# Patient Record
Sex: FEMALE | Race: WHITE | HISPANIC OR LATINO | Employment: UNEMPLOYED | URBAN - METROPOLITAN AREA
[De-identification: names, ages, dates, MRNs, and addresses within clinical notes are randomized per-mention and may not be internally consistent; named-entity substitution may affect disease eponyms.]

---

## 2021-12-01 ENCOUNTER — OFFICE VISIT (OUTPATIENT)
Dept: OBSTETRICS AND GYNECOLOGY | Facility: CLINIC | Age: 26
End: 2021-12-01

## 2021-12-01 VITALS
BODY MASS INDEX: 23.52 KG/M2 | DIASTOLIC BLOOD PRESSURE: 84 MMHG | HEIGHT: 64 IN | WEIGHT: 137.8 LBS | SYSTOLIC BLOOD PRESSURE: 112 MMHG

## 2021-12-01 DIAGNOSIS — O21.9 VOMITING OR NAUSEA OF PREGNANCY: ICD-10-CM

## 2021-12-01 DIAGNOSIS — K21.00 GASTROESOPHAGEAL REFLUX DISEASE WITH ESOPHAGITIS WITHOUT HEMORRHAGE: ICD-10-CM

## 2021-12-01 DIAGNOSIS — Z78.9 USES SPANISH AS PRIMARY SPOKEN LANGUAGE: ICD-10-CM

## 2021-12-01 DIAGNOSIS — Z98.891 HISTORY OF 2 CESAREAN SECTIONS: ICD-10-CM

## 2021-12-01 DIAGNOSIS — Z30.2 REQUEST FOR STERILIZATION: ICD-10-CM

## 2021-12-01 DIAGNOSIS — Z13.89 SCREENING FOR GENITOURINARY CONDITION: Primary | ICD-10-CM

## 2021-12-01 DIAGNOSIS — N92.6 MISSED MENSES: ICD-10-CM

## 2021-12-01 LAB
B-HCG UR QL: POSITIVE
EXPIRATION DATE: ABNORMAL
INTERNAL NEGATIVE CONTROL: NEGATIVE
INTERNAL POSITIVE CONTROL: POSITIVE
Lab: 55

## 2021-12-01 PROCEDURE — 99204 OFFICE O/P NEW MOD 45 MIN: CPT | Performed by: OBSTETRICS & GYNECOLOGY

## 2021-12-01 PROCEDURE — 81025 URINE PREGNANCY TEST: CPT | Performed by: OBSTETRICS & GYNECOLOGY

## 2021-12-01 RX ORDER — ONDANSETRON 4 MG/1
4 TABLET, FILM COATED ORAL DAILY PRN
Qty: 30 TABLET | Refills: 1 | OUTPATIENT
Start: 2021-12-01 | End: 2022-01-04

## 2021-12-01 RX ORDER — PNV NO.95/FERROUS FUM/FOLIC AC 28MG-0.8MG
TABLET ORAL
COMMUNITY
Start: 2021-11-12

## 2021-12-01 RX ORDER — FAMOTIDINE 20 MG/1
20 TABLET, FILM COATED ORAL DAILY
Qty: 30 TABLET | Refills: 1 | Status: SHIPPED | OUTPATIENT
Start: 2021-12-01 | End: 2021-12-16 | Stop reason: SDUPTHER

## 2021-12-01 NOTE — PROGRESS NOTES
OB CONFIRMATION APPOINTMENT    CC- Pt has had a menstrual irregularity    Chief Complaint   Patient presents with   • Amenorrhea     Pt having some chest pain x8 days        HISTORY OF PRESENT ILLNESS:    Amenorrhea  This is a new problem. The current episode started more than 1 month ago. The problem occurs constantly. The problem has been unchanged. Associated symptoms include nausea and vomiting. Associated symptoms comments: GERD  . The symptoms are aggravated by exertion. She has tried nothing for the symptoms.       Linda Escobedo is being seen today to confirm she is pregnant.    She is a 26 y.o.  Patient's last menstrual period was 2021..  EDC= 22.  Gestational age is 10+wks     Current obstetric complaints : nause and GERD symptoms.      Prior obstetric issues, potential pregnancy concerns:  x 2    Family history of genetic issues (includes FOB): no    OFFERED GENETIC TESTING: CfDNA, Cystic fibrosis, Spinal muscular atrophy, Fragile X syndrome: yes    Prior infections concerning in pregnancy (Rash, fever in last 2 weeks): no    HISTORY REVIEWED:      History reviewed. No pertinent past medical history.    History reviewed. No pertinent surgical history.      Current Outpatient Medications:   •  famotidine (Pepcid) 20 MG tablet, Take 1 tablet by mouth Daily., Disp: 30 tablet, Rfl: 1  •  ondansetron (Zofran) 4 MG tablet, Take 1 tablet by mouth Daily As Needed for Nausea or Vomiting., Disp: 30 tablet, Rfl: 1  •  Prenatal Vit-Fe Fumarate-FA (prenatal vitamin 28-0.8) 28-0.8 MG tablet tablet, , Disp: , Rfl:     No Known Allergies    Social History     Socioeconomic History   • Marital status:        History reviewed. No pertinent family history.    REVIEW OF SYSTEMS:     Review of Systems   Constitutional: Negative.    HENT: Negative.    Eyes: Negative.    Respiratory: Negative.    Cardiovascular: Negative.    Gastrointestinal: Positive for nausea and vomiting.  "  Endocrine: Negative.    Genitourinary: Positive for menstrual problem.   Musculoskeletal: Negative.    Skin: Negative.    Allergic/Immunologic: Negative.    Neurological: Negative.    Hematological: Negative.    Psychiatric/Behavioral: Negative.        Physical Exam     Physical Exam  Vitals and nursing note reviewed.   Constitutional:       Appearance: She is well-developed.   HENT:      Head: Normocephalic and atraumatic.   Cardiovascular:      Rate and Rhythm: Normal rate.   Pulmonary:      Effort: Pulmonary effort is normal.   Abdominal:      General: There is no distension.      Palpations: Abdomen is soft. There is no mass.      Tenderness: There is no abdominal tenderness. There is no guarding.      Comments: No doppler FHTs   Genitourinary:     Vagina: No vaginal discharge.   Musculoskeletal:         General: No tenderness or deformity. Normal range of motion.      Cervical back: Normal range of motion.   Skin:     General: Skin is warm and dry.      Coloration: Skin is not pale.      Findings: No erythema or rash.   Neurological:      Mental Status: She is alert and oriented to person, place, and time.   Psychiatric:         Behavior: Behavior normal.         Thought Content: Thought content normal.         Judgment: Judgment normal.             Objective    /84   Ht 162.6 cm (64\")   Wt 62.5 kg (137 lb 12.8 oz)   LMP 2021   Breastfeeding No   BMI 23.65 kg/m²     Linda Escobedo  has no history on file for tobacco use..                Assessment    1) Pregnancy at Unknown   Diagnoses and all orders for this visit:    1. Screening for genitourinary condition (Primary)  -     POC Pregnancy, Urine    2. Missed menses    3. Uses Colombian as primary spoken language    4. History of 2  sections: needs rpt.    5. Request for sterilization    6. Gastroesophageal reflux disease with esophagitis without hemorrhage    7. Vomiting or nausea of pregnancy    Other orders  -     " ondansetron (Zofran) 4 MG tablet; Take 1 tablet by mouth Daily As Needed for Nausea or Vomiting.  Dispense: 30 tablet; Refill: 1  -     famotidine (Pepcid) 20 MG tablet; Take 1 tablet by mouth Daily.  Dispense: 30 tablet; Refill: 1         Plan    Patient is on Prenatal vitamins  Problem list reviewed and updated.  Reviewed routine prenatal care with the patient  Zika (travel restrictions/ok to use insect repellant), not to changing cat litter, food restrictions, avoidance of alcohol, tobacco and drugs and saunas/hot tubs.   All questions answered.     Counseling was given to patient and family for the following topics: diagnostic results, instructions for management, risk factor reductions, prognosis, patient and family education, impressions, risks and benefits of treatment options and importance of treatment compliance .     I spent 45 minutes caring for Linda on this date of service. This time includes time spent by me in the following activities: preparing for the visit, reviewing tests, obtaining and/or reviewing a separately obtained history, performing a medically appropriate examination and/or evaluation, counseling and educating the patient/family/caregiver, ordering medications, tests, or procedures, referring and communicating with other health care professionals, documenting information in the medical record, independently interpreting results and communicating that information with the patient/family/caregiver, care coordination and reviewing u/s at bedside      RTO Return in about 2 weeks (around 12/15/2021) for ob phys w/ pap.    Morgan Guzman MD    12/1/2021  14:51 EST

## 2021-12-16 ENCOUNTER — INITIAL PRENATAL (OUTPATIENT)
Dept: OBSTETRICS AND GYNECOLOGY | Facility: CLINIC | Age: 26
End: 2021-12-16

## 2021-12-16 VITALS — DIASTOLIC BLOOD PRESSURE: 76 MMHG | SYSTOLIC BLOOD PRESSURE: 114 MMHG | WEIGHT: 136.4 LBS | BODY MASS INDEX: 23.41 KG/M2

## 2021-12-16 DIAGNOSIS — K21.00 GASTROESOPHAGEAL REFLUX DISEASE WITH ESOPHAGITIS WITHOUT HEMORRHAGE: ICD-10-CM

## 2021-12-16 DIAGNOSIS — O21.9 VOMITING OR NAUSEA OF PREGNANCY: ICD-10-CM

## 2021-12-16 DIAGNOSIS — Z11.51 ENCOUNTER FOR SCREENING FOR HUMAN PAPILLOMAVIRUS (HPV): ICD-10-CM

## 2021-12-16 DIAGNOSIS — Z36.9 ENCOUNTER FOR ANTENATAL SCREENING, UNSPECIFIED: ICD-10-CM

## 2021-12-16 DIAGNOSIS — Z98.891 HISTORY OF 2 CESAREAN SECTIONS: ICD-10-CM

## 2021-12-16 DIAGNOSIS — Z34.91 INITIAL OBSTETRIC VISIT IN FIRST TRIMESTER: ICD-10-CM

## 2021-12-16 DIAGNOSIS — Z01.419 PAP SMEAR, LOW-RISK: Primary | ICD-10-CM

## 2021-12-16 LAB
C TRACH RRNA SPEC DONR QL NAA+PROBE: NEGATIVE
GLUCOSE UR STRIP-MCNC: NEGATIVE MG/DL
N GONORRHOEA DNA SPEC QL NAA+PROBE: NEGATIVE
PROT UR STRIP-MCNC: NEGATIVE MG/DL

## 2021-12-16 PROCEDURE — 99214 OFFICE O/P EST MOD 30 MIN: CPT | Performed by: NURSE PRACTITIONER

## 2021-12-16 RX ORDER — FAMOTIDINE 20 MG/1
20 TABLET, FILM COATED ORAL 2 TIMES DAILY
Qty: 60 TABLET | Refills: 1 | OUTPATIENT
Start: 2021-12-16 | End: 2022-01-04

## 2021-12-16 NOTE — PROGRESS NOTES
Initial ob visit     Chief Complaint   Patient presents with   • Initial Prenatal Visit       Linda Escobedo is being seen today for her first obstetrical visit.  She is a 26 y.o.  @  12w3d gestation. This problem is new to me: yes. She has a history of  x 2.     # 1 - Date: None, Sex: None, Weight: None, GA: None, Delivery: , Low Transverse, Apgar1: None, Apgar5: None, Living: None, Birth Comments: None    # 2 - Date: None, Sex: None, Weight: None, GA: None, Delivery: , Low Transverse, Apgar1: None, Apgar5: None, Living: None, Birth Comments: None    # 3 - Date: None, Sex: None, Weight: None, GA: None, Delivery: None, Apgar1: None, Apgar5: None, Living: None, Birth Comments: None      LNMP: 21  Confident with date: Yes  Taking prenatal vitamins: Yes  Planned pregnancy: Yes  Prior obstetric issues, potential pregnancy concerns: c-sections x 2   Family history of genetic issues (includes FOB): denies   Varicella Hx: uncertain   Flu vaccine: S/P vaccine  COVID Vaccine: S/P COVID vaccine   History of STDs: denies HSV  Current medications: PNV   Last pap smear: Uncertain location of testing, reports pap 7 months ago    Smoker: No  Drug or alcohol abuse: No  H/O Physical, mental or sexual abuse: denies  H/O mental health disorder: denies   Prior testing for Cystic Fibrosis Carrier or Sickle Cell Trait- uncertain   Prepregnancy BMI: Body mass index is 23.41 kg/m².      No past medical history on file.    No past surgical history on file.      Current Outpatient Medications:   •  famotidine (Pepcid) 20 MG tablet, Take 1 tablet by mouth Daily., Disp: 30 tablet, Rfl: 1  •  ondansetron (Zofran) 4 MG tablet, Take 1 tablet by mouth Daily As Needed for Nausea or Vomiting., Disp: 30 tablet, Rfl: 1  •  Prenatal Vit-Fe Fumarate-FA (prenatal vitamin 28-0.8) 28-0.8 MG tablet tablet, , Disp: , Rfl:     No Known Allergies    Social History     Socioeconomic History   • Marital  Please advise on simvastatin.    status:        No family history on file.    Review of systems     All other systems reviewed and are negative except for: Constitutional: positive for fatigue  Gastrointestinal: positive for nausea, reflux symptoms and vomiting     Objective    /76   Wt 61.9 kg (136 lb 6.4 oz)   LMP 09/20/2021   BMI 23.41 kg/m²       General Appearance:    Alert, cooperative, in no acute distress, habitus normal   Head:    Not examined   Eyes:           Not examined   Ears:  Not examined       Neck:  No thyroid enlargement or nodules present   Back:     No kyphosis present, no scoliosis present,                       Lungs:     Clear to auscultation,respirations regular, even and                   unlabored    Heart:    Regular rhythm and normal rate, normal S1 and S2, no            murmur, no gallop, no rub, no click   Breast Exam:    No masses, No nipple discharge   Abdomen:     Normal bowel sounds, no masses, no organomegaly, soft        non-tender, non-distended, no guarding, no rebound                 tenderness   Genitalia:    Vulva - No masses, no atrophy, no lesions    Vagina - No discharge, No bleeding    Cervix - No Lesions, closed. Pap collected:Yes     Uterus - Consistent with 12 weeks.     Adnexa - No masses, non tender       Extremities:   Moves all extremities well, no edema, no cyanosis, no              redness       Skin:   No bleeding, bruising or rash       Neurologic:   Sensation intact, A&O times 3      Assessment/Plan    1) Pregnancy at 12w3d- US IMP: Single, viable IUP @ 12.3 weeks.  US findings discussed with patient. EDC established 6/27/22 and confirmed by US and LNMP.     2) OB exam: OB exam completed: Yes. New OB bag provided Yes. Pap collected: Yes.    3) Labs: OB labs collected: Yes Counseled on genetic screening: Yes, she declines CF, SMA, and FX. Counseled on Quad screen and AFP: Yes, she is too early for  AFP. Counseled on NIPS: Yes, she declines NIPS.      4) Patient's Body mass  index is 23.41 kg/m². indicating that she is within normal range (BMI 18.5-24.9). No BMI management plan needed. For women with a normal weight, with a BMI between 18.5-24.5 before pregnancy, the recommended weight gain is 25-35 pounds for the entire pregnancy      5)  Prenatal care: Oriented to the office and prenatal care. Encourage prenatal vitamins. Disc Tylenol products are fine, avoid aspirin and ibuprofen; Zika (travel restrictions/ok to use insect repellant); not to change cat litter; food restrictions; exercise;  avoidance of alcohol, tobacco, drugs and saunas/hot tubs.     6) COVID19 precautions were reviewed with the patient. Continue to encourage social distancing, wearing a mask, and good hand hygiene.  I wore a mask, protective eye wear, and gloves during this patient encounter.  Patient also wearing a surgical mask and social distancing was observed. Hand hygeine performed before and after seeing the patient. Info provided on Covid vaccine: S/P COVID vaccine. Rec Booster vaccine if she is >6 months post vaccine.     7) H/O  x 2- Plan repeat @ 39 weeks    8) French speaking-  used     9) Heartburn- Taking Pepcid daily. ERX Pepcid for BID dosing.     10) Nausea and vomiting- Has zofran for PRN use.     All questions answered.     RTO 4 weeks     AMPARO Phillips  2021  16:00 EST

## 2021-12-17 LAB
ABO GROUP BLD: ABNORMAL
BASOPHILS # BLD AUTO: 0 X10E3/UL (ref 0–0.2)
BASOPHILS NFR BLD AUTO: 0 %
BLD GP AB SCN SERPL QL: NEGATIVE
EOSINOPHIL # BLD AUTO: 0.1 X10E3/UL (ref 0–0.4)
EOSINOPHIL NFR BLD AUTO: 0 %
ERYTHROCYTE [DISTWIDTH] IN BLOOD BY AUTOMATED COUNT: 13.6 % (ref 11.7–15.4)
HBA1C MFR BLD: 5.4 % (ref 4.8–5.6)
HBV SURFACE AG SERPL QL IA: NEGATIVE
HCT VFR BLD AUTO: 34.8 % (ref 34–46.6)
HCV AB S/CO SERPL IA: 0.1 S/CO RATIO (ref 0–0.9)
HGB BLD-MCNC: 11.6 G/DL (ref 11.1–15.9)
HIV 1+2 AB+HIV1 P24 AG SERPL QL IA: NON REACTIVE
IMM GRANULOCYTES # BLD AUTO: 0 X10E3/UL (ref 0–0.1)
IMM GRANULOCYTES NFR BLD AUTO: 0 %
LYMPHOCYTES # BLD AUTO: 2.6 X10E3/UL (ref 0.7–3.1)
LYMPHOCYTES NFR BLD AUTO: 23 %
MCH RBC QN AUTO: 29.5 PG (ref 26.6–33)
MCHC RBC AUTO-ENTMCNC: 33.3 G/DL (ref 31.5–35.7)
MCV RBC AUTO: 89 FL (ref 79–97)
MONOCYTES # BLD AUTO: 0.6 X10E3/UL (ref 0.1–0.9)
MONOCYTES NFR BLD AUTO: 5 %
NEUTROPHILS # BLD AUTO: 7.8 X10E3/UL (ref 1.4–7)
NEUTROPHILS NFR BLD AUTO: 72 %
PLATELET # BLD AUTO: 343 X10E3/UL (ref 150–450)
RBC # BLD AUTO: 3.93 X10E6/UL (ref 3.77–5.28)
RH BLD: POSITIVE
RPR SER QL: NON REACTIVE
RUBV IGG SERPL IA-ACNC: 18 INDEX
VZV IGG SER IA-ACNC: 930 INDEX
WBC # BLD AUTO: 11.1 X10E3/UL (ref 3.4–10.8)

## 2021-12-20 LAB
AMPHETAMINES UR QL SCN: NEGATIVE NG/ML
BACTERIA UR CULT: NO GROWTH
BACTERIA UR CULT: NORMAL
BARBITURATES UR QL SCN: NEGATIVE NG/ML
BENZODIAZ UR QL SCN: NEGATIVE NG/ML
BZE UR QL SCN: NEGATIVE NG/ML
CANNABINOIDS UR QL SCN: NEGATIVE NG/ML
CREAT UR-MCNC: 34.3 MG/DL (ref 20–300)
LABORATORY COMMENT REPORT: NORMAL
METHADONE UR QL SCN: NEGATIVE NG/ML
OPIATES UR QL SCN: NEGATIVE NG/ML
OXYCODONE+OXYMORPHONE UR QL SCN: NEGATIVE NG/ML
PCP UR QL: NEGATIVE NG/ML
PH UR: 6.5 [PH] (ref 4.5–8.9)
PROPOXYPH UR QL SCN: NEGATIVE NG/ML

## 2021-12-21 LAB
C TRACH RRNA CVX QL NAA+PROBE: NEGATIVE
CONV .: NORMAL
CYTOLOGIST CVX/VAG CYTO: NORMAL
CYTOLOGY CVX/VAG DOC CYTO: NORMAL
CYTOLOGY CVX/VAG DOC THIN PREP: NORMAL
DX ICD CODE: NORMAL
HIV 1 & 2 AB SER-IMP: NORMAL
N GONORRHOEA RRNA CVX QL NAA+PROBE: NEGATIVE
OTHER STN SPEC: NORMAL
STAT OF ADQ CVX/VAG CYTO-IMP: NORMAL
T VAGINALIS RRNA SPEC QL NAA+PROBE: NEGATIVE

## 2022-01-04 ENCOUNTER — HOSPITAL ENCOUNTER (EMERGENCY)
Facility: HOSPITAL | Age: 27
Discharge: HOME | End: 2022-01-04
Attending: EMERGENCY MEDICINE | Admitting: EMERGENCY MEDICINE

## 2022-01-04 VITALS
HEIGHT: 63 IN | DIASTOLIC BLOOD PRESSURE: 70 MMHG | RESPIRATION RATE: 18 BRPM | HEART RATE: 65 BPM | WEIGHT: 131.7 LBS | OXYGEN SATURATION: 100 % | BODY MASS INDEX: 23.34 KG/M2 | SYSTOLIC BLOOD PRESSURE: 112 MMHG | TEMPERATURE: 98.6 F

## 2022-01-04 DIAGNOSIS — G47.00 INSOMNIA, UNSPECIFIED TYPE: Primary | ICD-10-CM

## 2022-01-04 PROCEDURE — 99282 EMERGENCY DEPT VISIT SF MDM: CPT | Performed by: EMERGENCY MEDICINE

## 2022-01-04 PROCEDURE — 99283 EMERGENCY DEPT VISIT LOW MDM: CPT

## 2022-01-04 RX ORDER — CHOLECALCIFEROL (VITAMIN D3) 125 MCG
10 CAPSULE ORAL
COMMUNITY

## 2022-01-04 RX ORDER — ACETAMINOPHEN 500 MG
1000 TABLET ORAL ONCE
Status: COMPLETED | OUTPATIENT
Start: 2022-01-04 | End: 2022-01-04

## 2022-01-04 RX ADMIN — ACETAMINOPHEN 1000 MG: 500 TABLET ORAL at 16:49

## 2022-01-04 NOTE — ED PROVIDER NOTES
EMERGENCY DEPARTMENT ENCOUNTER      Room Number: 10/10      HPI:    Chief complaint: Insomnia    Location: Not applicable    Quality/Severity: Moderate    Timing/Duration: Patient states that she has been unable to sleep for 5 days    Modifying Factors: Patient tried taking melatonin last evening without sleep being induced.    Associated Symptoms: Frontal headache started yesterday    Narrative: Pt is a 26 y.o. female who presents complaining of insomnia x5 days.  Patient states that she is very tired, but cannot fall asleep after laying down.  Patient is a currently in her second trimester of pregnancy, but denies any unusual new stressors.      PMD: System, Provider Not In    REVIEW OF SYSTEMS  Review of Systems   Constitutional: Positive for appetite change and fatigue. Negative for activity change and fever.   HENT: Negative for congestion.    Respiratory: Negative for shortness of breath.    Cardiovascular: Negative for chest pain.   Neurological: Positive for headaches.   Psychiatric/Behavioral: Positive for sleep disturbance.   All other systems reviewed and are negative.      PAST MEDICAL HISTORY  Active Ambulatory Problems     Diagnosis Date Noted   • Uses Belarusian as primary spoken language 2021   • Request for sterilization 2021   • History of 2  sections: needs rpt. 2021   • Gastroesophageal reflux disease with esophagitis without hemorrhage 2021   • Vomiting or nausea of pregnancy 2021     Resolved Ambulatory Problems     Diagnosis Date Noted   • No Resolved Ambulatory Problems     No Additional Past Medical History       PAST SURGICAL HISTORY  History reviewed. No pertinent surgical history.    FAMILY HISTORY  History reviewed. No pertinent family history.    SOCIAL HISTORY  Social History     Socioeconomic History   • Marital status:    Tobacco Use   • Smoking status: Never Smoker   Substance and Sexual Activity   • Alcohol use: Never   • Drug use: Never        ALLERGIES  Patient has no known allergies.    PHYSICAL EXAM  ED Triage Vitals   Temp Heart Rate Resp BP SpO2   01/04/22 1552 01/04/22 1549 01/04/22 1549 01/04/22 1549 01/04/22 1549   98.6 °F (37 °C) 65 18 112/70 100 %      Temp src Heart Rate Source Patient Position BP Location FiO2 (%)   01/04/22 1549 01/04/22 1549 01/04/22 1549 01/04/22 1549 --   Oral Monitor Sitting Right arm        Physical Exam  Constitutional:       Appearance: Normal appearance. She is normal weight.   Cardiovascular:      Rate and Rhythm: Normal rate and regular rhythm.      Heart sounds: Normal heart sounds. No murmur heard.      Pulmonary:      Effort: Pulmonary effort is normal.      Breath sounds: Normal breath sounds.   Musculoskeletal:      Right lower leg: No edema.      Left lower leg: No edema.   Neurological:      General: No focal deficit present.      Mental Status: She is alert and oriented to person, place, and time.   Psychiatric:      Comments: Mild somnolence         LAB RESULTS  Results for orders placed or performed in visit on 12/16/21   Urine Culture - Urine, Urine, Random Void    Specimen: Urine, Random Void   Result Value Ref Range    Urine Culture Final report     Result 1 No growth    Obstetric Panel    Specimen: Blood   Result Value Ref Range    Hepatitis B Surface Ag Negative Negative    Hep C Virus Ab 0.1 0.0 - 0.9 s/co ratio    RPR Non Reactive Non Reactive    Rubella Antibodies, IgG 18.00 Immune >0.99 index    ABO Type O     Rh Factor Positive     Antibody Screen Negative Negative    WBC 11.1 (H) 3.4 - 10.8 x10E3/uL    RBC 3.93 3.77 - 5.28 x10E6/uL    Hemoglobin 11.6 11.1 - 15.9 g/dL    Hematocrit 34.8 34.0 - 46.6 %    MCV 89 79 - 97 fL    MCH 29.5 26.6 - 33.0 pg    MCHC 33.3 31.5 - 35.7 g/dL    RDW 13.6 11.7 - 15.4 %    Platelets 343 150 - 450 x10E3/uL    Neutrophil Rel % 72 Not Estab. %    Lymphocyte Rel % 23 Not Estab. %    Monocyte Rel % 5 Not Estab. %    Eosinophil Rel % 0 Not Estab. %    Basophil Rel  % 0 Not Estab. %    Neutrophils Absolute 7.8 (H) 1.4 - 7.0 x10E3/uL    Lymphocytes Absolute 2.6 0.7 - 3.1 x10E3/uL    Monocytes Absolute 0.6 0.1 - 0.9 x10E3/uL    Eosinophils Absolute 0.1 0.0 - 0.4 x10E3/uL    Basophils Absolute 0.0 0.0 - 0.2 x10E3/uL    Immature Granulocyte Rel % 0 Not Estab. %    Immature Grans Absolute 0.0 0.0 - 0.1 x10E3/uL   HIV-1 / O / 2 Ag / Antibody 4th Generation    Specimen: Blood   Result Value Ref Range    HIV Screen 4th Gen w/RFX (Reference) Non Reactive Non Reactive   Hemoglobin A1c    Specimen: Blood   Result Value Ref Range    Hemoglobin A1C 5.4 4.8 - 5.6 %   Urine Drug Screen - Urine, Random Void    Specimen: Urine, Random Void   Result Value Ref Range    Amphetamine, Urine Qual Negative Eooqmq=2994 ng/mL    Barbiturates Screen, Urine Negative Pahoju=650 ng/mL    Benzodiazepine Screen, Urine Negative Uxghrv=900 ng/mL    THC Screen, Urine Negative Cutoff=20 ng/mL    Cocaine Screen, Urine Negative Ipkypo=882 ng/mL    Opiate Screen, Urine Negative Rdvhac=361 ng/mL    Oxycodone/Oxymorphone, Urine Negative Bfqzwa=351 ng/mL    Phencyclidine (PCP), Urine Negative Cutoff=25 ng/mL    Methadone Screen, Urine Negative Njcpxg=106 ng/mL    Propoxyphene Screen Negative Rplzfc=877 ng/mL    Creatinine, Urine 34.3 20.0 - 300.0 mg/dL    pH, UA 6.5 4.5 - 8.9    Please note Comment    Varicella Zoster Antibody, IgG    Specimen: Blood   Result Value Ref Range    Varicella IgG 930 Immune >165 index   POC Urinalysis Dipstick    Specimen: Urine   Result Value Ref Range    Glucose, UA Negative Negative, 1000 mg/dL (3+)    Protein, POC Negative Negative   IGP,CtNgTv,rfx Aptima HPV ASCU    Specimen: Cervix; ThinPrep Vial   Result Value Ref Range    Diagnosis Comment     Specimen adequacy: Comment     Clinician Provided ICD-10: Comment     Performed by: Comment     . .     Note: Comment     Method: Comment     Conv .conv Comment     Chlamydia, Nuc. Acid Amp Negative Negative    Gonococcus by Nucleic Acid Amp  Negative Negative    Trichomonas vaginosis Negative Negative         I ordered the above labs and reviewed the results    RADIOLOGY  US Ob Transvaginal    Result Date: 12/30/2021  Narrative: IMP: SIngle, viable IUP @ 12.3 weeks. EDC 6/27/22. Ovaries wnl. Uterus AV. .       I ordered the above radiologic testing and reviewed the results    PROCEDURES  Procedures      PROGRESS AND CONSULTS  ED Course as of 01/04/22 1702 Tue Jan 04, 2022 1700 Through a video  it was explained that a sleep routine should help alleviate her problems (thoroughly explained).  Patient was advised that she could take 10 mg of melatonin 1 hour prior to her bedtime.  Patient also advised that she can take Tylenol for headaches. [ML]      ED Course User Index  [ML] Fracisco Victoria MD           MEDICAL DECISION MAKING  Results were reviewed/discussed with the patient and they were also made aware of online access. Pt also made aware that some labs, such as cultures, will not be resulted during ER visit and follow up with PMD is necessary.     MDM       DIAGNOSIS  Final diagnoses:   Insomnia, unspecified type       Latest Documented Vital Signs:  As of 16:57 EST  BP- 112/70 HR- 65 Temp- 98.6 °F (37 °C) (Oral) O2 sat- 100%    DISPOSITION  Discharged in good condition       Medication List      Stop    famotidine 20 MG tablet  Commonly known as: Pepcid     ondansetron 4 MG tablet  Commonly known as: Zofran             Follow-up Information     Your obstetrician as scheduled.                          Fracisco Victoria MD  01/04/22 1702

## 2022-01-04 NOTE — DISCHARGE INSTRUCTIONS
Starting 90 minutes prior to your bedtime begin to wind down.  This means to stop using your smart phone, watching television and any participation in activities where you are moving around a lot.  Take 10 mg of melatonin 1 hour before bedtime.  You may use Tylenol for headache.

## 2022-01-05 ENCOUNTER — ROUTINE PRENATAL (OUTPATIENT)
Dept: OBSTETRICS AND GYNECOLOGY | Facility: CLINIC | Age: 27
End: 2022-01-05

## 2022-01-05 VITALS — WEIGHT: 133.4 LBS | SYSTOLIC BLOOD PRESSURE: 100 MMHG | BODY MASS INDEX: 23.63 KG/M2 | DIASTOLIC BLOOD PRESSURE: 68 MMHG

## 2022-01-05 DIAGNOSIS — Z34.92 PRENATAL CARE IN SECOND TRIMESTER: Primary | ICD-10-CM

## 2022-01-05 DIAGNOSIS — Z36.9 ENCOUNTER FOR ANTENATAL SCREENING, UNSPECIFIED: ICD-10-CM

## 2022-01-05 DIAGNOSIS — O26.892 HEADACHE IN PREGNANCY, ANTEPARTUM, SECOND TRIMESTER: ICD-10-CM

## 2022-01-05 DIAGNOSIS — Z78.9 USES SPANISH AS PRIMARY SPOKEN LANGUAGE: ICD-10-CM

## 2022-01-05 DIAGNOSIS — R51.9 HEADACHE IN PREGNANCY, ANTEPARTUM, SECOND TRIMESTER: ICD-10-CM

## 2022-01-05 LAB
EXTERNAL CYSTIC FIBROSIS: NORMAL
EXTERNAL NIPT: NORMAL
GLUCOSE UR STRIP-MCNC: NEGATIVE MG/DL
PROT UR STRIP-MCNC: NEGATIVE MG/DL

## 2022-01-05 PROCEDURE — 99213 OFFICE O/P EST LOW 20 MIN: CPT | Performed by: NURSE PRACTITIONER

## 2022-01-05 RX ORDER — ACETAMINOPHEN 500 MG
500 TABLET ORAL EVERY 6 HOURS PRN
COMMUNITY

## 2022-01-05 NOTE — PROGRESS NOTES
OB follow up     CC:  Here for prenatal follow up    Linda Escobedo is a 26 y.o.  15w2d being seen today for her obstetrical visit.  Patient reports headache. States she was seen in the ER yesterday for her c/o. A review of records from the ER reports a c/o insomnia. The patient reports she was given 2 pills to take. Her HA improved after taking the pill but her HA returned shortly after taking the medication. She was not tested for COVID. Taking +PNV.     Review of Systems  Consitutional: neg fatigue  Cardiovascular: neg edema  Gastrointestinal: neg n/v  Genitourinary: Neg for cramping, vaginal bleeding, SROM, or dysuria.   Neuro: + HA       /68   Wt 60.5 kg (133 lb 6.4 oz)   LMP 2021   BMI 23.63 kg/m²     FHT: present BPM   Uterine Size: size equals dates       Assessment    1) Pregnancy at 15w2d- Desires Quad screen.     2) COVID19 precautions were reviewed with the patient. Continue to encourage social distancing, wearing a mask, and good hand hygiene.  I wore a mask, protective eye wear, and gloves during this patient encounter.  Patient also wearing a surgical mask and social distancing was observed. Hand hygeine performed before and after seeing the patient. Info provided on Covid vaccine: S/P COVID vaccine. Rec Booster vaccine if she is >6 months post vaccine.      3) H/O  x 2- Plan repeat @ 39 weeks     4) Jamaican speaking-  used      5) Heartburn- Taking Pepcid BID.      6) Nausea and vomiting- Has zofran for PRN use.     7) Headache- Enc use of antihistamine, increased H20, and caffeine.       Plan    Continue prenatal vitamins   Reviewed this stage of pregnancy  Problem list updated   Follow up in 4 weeks for OB tummy and anatomy US     Eve Maradiaga, APRN  2022  14:14 EST

## 2022-01-08 LAB
2ND TRIMESTER 4 SCREEN SERPL-IMP: ABNORMAL
AFP ADJ MOM SERPL: 0.59
AFP SERPL-MCNC: 19.5 NG/ML
AGE AT DELIVERY: 29.1 YR
FET TS 18 RISK FROM MAT AGE: ABNORMAL
FET TS 21 RISK FROM MAT AGE: 773
GA METHOD: ABNORMAL
GA: 15.3 WEEKS
HCG ADJ MOM SERPL: 2.25
HCG SERPL-ACNC: ABNORMAL MIU/ML
IDDM PATIENT QL: NO
INHIBIN A ADJ MOM SERPL: 1.69
INHIBIN A SERPL-MCNC: 307.95 PG/ML
MULTIPLE PREGNANCY: NO
NEURAL TUBE DEFECT RISK FETUS: ABNORMAL %
SERVICE CMNT-IMP: ABNORMAL
TS 18 RISK FETUS: ABNORMAL
TS 21 RISK FETUS: 40
U ESTRIOL ADJ MOM SERPL: 0.57
U ESTRIOL SERPL-MCNC: 0.48 NG/ML

## 2022-02-02 ENCOUNTER — ROUTINE PRENATAL (OUTPATIENT)
Dept: OBSTETRICS AND GYNECOLOGY | Facility: CLINIC | Age: 27
End: 2022-02-02

## 2022-02-02 VITALS — WEIGHT: 132 LBS | SYSTOLIC BLOOD PRESSURE: 98 MMHG | DIASTOLIC BLOOD PRESSURE: 62 MMHG | BODY MASS INDEX: 23.38 KG/M2

## 2022-02-02 DIAGNOSIS — Z78.9 USES SPANISH AS PRIMARY SPOKEN LANGUAGE: ICD-10-CM

## 2022-02-02 DIAGNOSIS — Z34.92 NORMAL PREGNANCY IN SECOND TRIMESTER: Primary | ICD-10-CM

## 2022-02-02 DIAGNOSIS — Z98.891 HISTORY OF 2 CESAREAN SECTIONS: ICD-10-CM

## 2022-02-02 DIAGNOSIS — Z30.2 REQUEST FOR STERILIZATION: ICD-10-CM

## 2022-02-02 LAB
GLUCOSE UR STRIP-MCNC: NEGATIVE MG/DL
PROT UR STRIP-MCNC: NEGATIVE MG/DL

## 2022-02-02 PROCEDURE — 99214 OFFICE O/P EST MOD 30 MIN: CPT | Performed by: OBSTETRICS & GYNECOLOGY

## 2022-02-02 RX ORDER — ONDANSETRON 4 MG/1
4 TABLET, FILM COATED ORAL EVERY 8 HOURS PRN
Qty: 30 TABLET | Refills: 2 | Status: SHIPPED | OUTPATIENT
Start: 2022-02-02

## 2022-02-02 NOTE — PROGRESS NOTES
OB follow up     Linda Escobedo is a 26 y.o.  19w2d being seen today for her obstetrical visit.  Patient reports no bleeding, no contractions and no leaking. Fetal movement: normal.  Prenatal care complicated by 2 previous C-sections.  She requests permanent sterilization.  She is a Yoruba as a primary spoken language and a  was used throughout this visit.  She is concerned because she is losing weight.  She has not having severe nausea and vomiting and she is using Zofran at home and would like a refill.    Review of Systems  No bleeding, No cramping/contractions     BP 98/62   Wt 59.9 kg (132 lb)   LMP 2021   BMI 23.38 kg/m²     FHT: present BPM   Uterine Size: 19 cm   Ultrasound shows live viable john fetus with normal growth in the transverse position.  Fetus appears female.  Good cardiac activity and heart rate is 146 bpm.  Normal anatomical survey but not all cardiac views were seen.  Normal fluid level.    Assessment/Plan:    1) 26 y.o.  -pregnancy at 19w2d    2)   Encounter Diagnoses   Name Primary?   • Normal pregnancy in second trimester Yes   • History of 2  sections: needs rpt.    • Request for sterilization    • Uses Yoruba as primary spoken language    Plan repeat low-transverse  section and tubal ligation at 39 weeks.  Recommend repeat cardiac views in 4 weeks.  Refill Zofran as needed.  Watch weight.    3) Reviewed this stage of pregnancy  4) Problem list updated     Return in about 4 weeks (around 3/2/2022) for OB Raman, US, Anatomy (heart views).      Peter Babb MD    2022  15:01 EST

## 2022-03-02 ENCOUNTER — ROUTINE PRENATAL (OUTPATIENT)
Dept: OBSTETRICS AND GYNECOLOGY | Facility: CLINIC | Age: 27
End: 2022-03-02

## 2022-03-02 VITALS — WEIGHT: 135 LBS | SYSTOLIC BLOOD PRESSURE: 100 MMHG | DIASTOLIC BLOOD PRESSURE: 60 MMHG | BODY MASS INDEX: 23.91 KG/M2

## 2022-03-02 DIAGNOSIS — Z78.9 USES SPANISH AS PRIMARY SPOKEN LANGUAGE: ICD-10-CM

## 2022-03-02 DIAGNOSIS — Z98.891 HISTORY OF 2 CESAREAN SECTIONS: ICD-10-CM

## 2022-03-02 DIAGNOSIS — Z34.92 PRENATAL CARE IN SECOND TRIMESTER: Primary | ICD-10-CM

## 2022-03-02 DIAGNOSIS — Z30.2 REQUEST FOR STERILIZATION: ICD-10-CM

## 2022-03-02 LAB
GLUCOSE UR STRIP-MCNC: NEGATIVE MG/DL
PROT UR STRIP-MCNC: NEGATIVE MG/DL

## 2022-03-02 PROCEDURE — 99213 OFFICE O/P EST LOW 20 MIN: CPT | Performed by: NURSE PRACTITIONER

## 2022-03-02 NOTE — PROGRESS NOTES
OB follow up     CC:  Here for prenatal follow up    Linda Escobedo is a 26 y.o.  @ 23w2d being seen today for her obstetrical visit.  She had a repeat US today to complete her anatomy US.       Review of Systems  Consitutional: neg fatigue  Cardiovascular: neg edema  Gastrointestinal: neg n/v  Genitourinary: Neg for cramping, vaginal bleeding, SROM, or dysuria.         /60   Wt 61.2 kg (135 lb)   LMP 2021   BMI 23.91 kg/m²     FHT: present BPM   Uterine Size: size equals dates       Assessment    1) Pregnancy at 23w2d- IMP: Growth 30%. Cardiac anatomy normal today.     2) COVID19 precautions were reviewed with the patient. Continue to encourage social distancing, wearing a mask, and good hand hygiene.  I wore a mask, protective eye wear, and gloves during this patient encounter.  Patient also wearing a surgical mask and social distancing was observed. Hand hygeine performed before and after seeing the patient. Info provided on Covid vaccine: S/P COVID vaccine. Rec Booster vaccine if she is >6 months post vaccine.      3) H/O  x 2- Plan repeat @ 39 weeks     4) Somali speaking-  used     5) Nausea and vomiting- Improved     6) Flu vaccine- Enc flu vaccine     7)  Request for sterilization- Desires bilateral salpingectomy. Sign tubal consent today.       Plan    Continue prenatal vitamins   Reviewed this stage of pregnancy  Problem list updated   Follow up in 4 weeks for OB tummy and 2hr GTT     AMPARO Phillips  3/2/2022  16:18 EST

## 2022-03-08 DIAGNOSIS — Z36.9 ENCOUNTER FOR ANTENATAL SCREENING, UNSPECIFIED: Primary | ICD-10-CM

## 2022-03-30 ENCOUNTER — ROUTINE PRENATAL (OUTPATIENT)
Dept: OBSTETRICS AND GYNECOLOGY | Facility: CLINIC | Age: 27
End: 2022-03-30

## 2022-03-30 VITALS — DIASTOLIC BLOOD PRESSURE: 62 MMHG | BODY MASS INDEX: 24.59 KG/M2 | WEIGHT: 138.8 LBS | SYSTOLIC BLOOD PRESSURE: 100 MMHG

## 2022-03-30 DIAGNOSIS — Z23 NEED FOR DTAP VACCINE: Primary | ICD-10-CM

## 2022-03-30 DIAGNOSIS — Z36.9 ENCOUNTER FOR ANTENATAL SCREENING, UNSPECIFIED: ICD-10-CM

## 2022-03-30 DIAGNOSIS — Z30.2 REQUEST FOR STERILIZATION: ICD-10-CM

## 2022-03-30 DIAGNOSIS — Z78.9 USES SPANISH AS PRIMARY SPOKEN LANGUAGE: ICD-10-CM

## 2022-03-30 DIAGNOSIS — K21.00 GASTROESOPHAGEAL REFLUX DISEASE WITH ESOPHAGITIS WITHOUT HEMORRHAGE: ICD-10-CM

## 2022-03-30 DIAGNOSIS — Z98.891 HISTORY OF 2 CESAREAN SECTIONS: ICD-10-CM

## 2022-03-30 LAB
GLUCOSE UR STRIP-MCNC: NEGATIVE MG/DL
PROT UR STRIP-MCNC: NEGATIVE MG/DL

## 2022-03-30 PROCEDURE — 90472 IMMUNIZATION ADMIN EACH ADD: CPT | Performed by: OBSTETRICS & GYNECOLOGY

## 2022-03-30 PROCEDURE — 99214 OFFICE O/P EST MOD 30 MIN: CPT | Performed by: OBSTETRICS & GYNECOLOGY

## 2022-03-30 PROCEDURE — 90686 IIV4 VACC NO PRSV 0.5 ML IM: CPT | Performed by: OBSTETRICS & GYNECOLOGY

## 2022-03-30 PROCEDURE — 90471 IMMUNIZATION ADMIN: CPT | Performed by: OBSTETRICS & GYNECOLOGY

## 2022-03-30 PROCEDURE — 90715 TDAP VACCINE 7 YRS/> IM: CPT | Performed by: OBSTETRICS & GYNECOLOGY

## 2022-03-31 PROBLEM — D50.9 IRON DEFICIENCY ANEMIA DURING PREGNANCY: Status: ACTIVE | Noted: 2022-03-31

## 2022-03-31 PROBLEM — O99.019 IRON DEFICIENCY ANEMIA DURING PREGNANCY: Status: ACTIVE | Noted: 2022-03-31

## 2022-03-31 LAB
GLUCOSE 1H P 75 G GLC PO SERPL-MCNC: 133 MG/DL (ref 65–179)
GLUCOSE 2H P 75 G GLC PO SERPL-MCNC: 117 MG/DL (ref 65–152)
GLUCOSE P FAST SERPL-MCNC: 80 MG/DL (ref 65–91)
HCT VFR BLD AUTO: 28.3 % (ref 34–46.6)
HGB BLD-MCNC: 9.3 G/DL (ref 11.1–15.9)

## 2022-03-31 RX ORDER — DOXYCYCLINE HYCLATE 50 MG/1
324 CAPSULE, GELATIN COATED ORAL 2 TIMES DAILY
Qty: 100 TABLET | Refills: 2 | Status: SHIPPED | OUTPATIENT
Start: 2022-03-31

## 2022-04-13 ENCOUNTER — ROUTINE PRENATAL (OUTPATIENT)
Dept: OBSTETRICS AND GYNECOLOGY | Facility: CLINIC | Age: 27
End: 2022-04-13

## 2022-04-13 VITALS — DIASTOLIC BLOOD PRESSURE: 60 MMHG | WEIGHT: 137 LBS | BODY MASS INDEX: 24.27 KG/M2 | SYSTOLIC BLOOD PRESSURE: 102 MMHG

## 2022-04-13 DIAGNOSIS — O99.019 IRON DEFICIENCY ANEMIA DURING PREGNANCY: ICD-10-CM

## 2022-04-13 DIAGNOSIS — K21.00 GASTROESOPHAGEAL REFLUX DISEASE WITH ESOPHAGITIS WITHOUT HEMORRHAGE: ICD-10-CM

## 2022-04-13 DIAGNOSIS — Z78.9 USES SPANISH AS PRIMARY SPOKEN LANGUAGE: ICD-10-CM

## 2022-04-13 DIAGNOSIS — Z98.891 HISTORY OF 2 CESAREAN SECTIONS: ICD-10-CM

## 2022-04-13 DIAGNOSIS — D50.9 IRON DEFICIENCY ANEMIA DURING PREGNANCY: ICD-10-CM

## 2022-04-13 DIAGNOSIS — Z34.93 PRENATAL CARE IN THIRD TRIMESTER: Primary | ICD-10-CM

## 2022-04-13 LAB
EXTERNAL NIPT: NORMAL
GLUCOSE UR STRIP-MCNC: NEGATIVE MG/DL
PROT UR STRIP-MCNC: NEGATIVE MG/DL

## 2022-04-13 PROCEDURE — 99213 OFFICE O/P EST LOW 20 MIN: CPT | Performed by: NURSE PRACTITIONER

## 2022-04-13 RX ORDER — FAMOTIDINE 20 MG/1
20 TABLET, FILM COATED ORAL DAILY
Qty: 60 TABLET | Refills: 1 | Status: SHIPPED | OUTPATIENT
Start: 2022-04-13 | End: 2022-07-11

## 2022-04-13 NOTE — PROGRESS NOTES
OB follow up     CC:  Here for prenatal follow up    Linda Escobedo is a 26 y.o.  @ 29w2d being seen today for her obstetrical visit.  She reports good fetal movement. She c/o pain in her chest. When questioned, the pain is c/w heartburn.     Review of Systems  Consitutional: neg fatigue  Cardiovascular: neg edema  Gastrointestinal: neg n/v + heartburn   Genitourinary: Neg for cramping, vaginal bleeding, SROM, or dysuria.         /60   Wt 62.1 kg (137 lb)   LMP 2021   BMI 24.27 kg/m²     FHT: Present 120s  BPM   Uterine Size: 29 cm       Assessment    1) Pregnancy at 29w2d- Passed 2hr GTT. Rh +.     2) COVID19 precautions were reviewed with the patient. Continue to encourage social distancing, wearing a mask, and good hand hygiene.  I wore a mask, protective eye wear, and gloves during this patient encounter.  Patient also wearing a surgical mask and social distancing was observed. Hand hygeine performed before and after seeing the patient. Info provided on Covid vaccine: S/P COVID vaccine. Rec Booster vaccine if she is >6 months post vaccine.      3) H/O  x 2- Plan repeat @ 39 weeks     4) Luxem[place]ish speaking-  used     5) Nausea and vomiting- Improved     6)  S/P flu vaccine      7)  Request for sterilization- Tubal consent signed    8)  + quad screen-  Screen + for Down Syndrome. OcyhyelH19 neg.     9) Anemia- Taking iron daily. Check Hgb next visit.     10) S/P Tdap vaccine     11) Heartburn- ERX Pepcid.       Plan    Continue prenatal vitamins   Reviewed this stage of pregnancy  Problem list updated   Follow up in 2 weeks for OB kimberlyn Maradiaga, AMPARO  2022  13:55 EDT

## 2022-05-02 ENCOUNTER — ROUTINE PRENATAL (OUTPATIENT)
Dept: OBSTETRICS AND GYNECOLOGY | Facility: CLINIC | Age: 27
End: 2022-05-02

## 2022-05-02 VITALS — DIASTOLIC BLOOD PRESSURE: 60 MMHG | BODY MASS INDEX: 26.22 KG/M2 | SYSTOLIC BLOOD PRESSURE: 108 MMHG | WEIGHT: 148 LBS

## 2022-05-02 DIAGNOSIS — Z78.9 USES SPANISH AS PRIMARY SPOKEN LANGUAGE: ICD-10-CM

## 2022-05-02 DIAGNOSIS — Z30.2 REQUEST FOR STERILIZATION: Primary | ICD-10-CM

## 2022-05-02 DIAGNOSIS — Z98.891 HISTORY OF 2 CESAREAN SECTIONS: ICD-10-CM

## 2022-05-02 DIAGNOSIS — D50.9 IRON DEFICIENCY ANEMIA DURING PREGNANCY: ICD-10-CM

## 2022-05-02 DIAGNOSIS — K21.00 GASTROESOPHAGEAL REFLUX DISEASE WITH ESOPHAGITIS WITHOUT HEMORRHAGE: ICD-10-CM

## 2022-05-02 DIAGNOSIS — O99.019 IRON DEFICIENCY ANEMIA DURING PREGNANCY: ICD-10-CM

## 2022-05-02 LAB
GLUCOSE UR STRIP-MCNC: NEGATIVE MG/DL
PROT UR STRIP-MCNC: NEGATIVE MG/DL

## 2022-05-02 PROCEDURE — 99213 OFFICE O/P EST LOW 20 MIN: CPT | Performed by: OBSTETRICS & GYNECOLOGY

## 2022-05-02 NOTE — PROGRESS NOTES
CC: Pt here for ob office visit.    HPI: Linda Escobedo is a 27 y.o.  32w0d being seen today for her obstetrical visit.   Patient reports backache .   Fetal movement: normal. .        ROS: Pt denies visual changes, headaches, shortness of breath, chest pain, esophageal reflux, gastric pain,   nausea and vomiting, diarrhea, rashes, vaginal bleeding, edema, hip pain, pelvic pressure.     SMOKER? No    ALCOHOL? No  ILLICIT DRUGS? No    O:  /60   Wt 67.1 kg (148 lb)   LMP 2021   BMI 26.22 kg/m² , additional findings in addition to above flow sheet?: none    Data Review:  UA, flow sheet,  TESTING: u/s: no    Lab Results (last 24 hours)     Procedure Component Value Units Date/Time    POC Urinalysis Dipstick [[id number]] Resulted: 22    Specimen: Urine Updated: 22 1553     Glucose, UA Negative     Protein, POC Negative          I saw the patient with a face mask, gloves and eye protection  The patient herself was masked.  Social distancing was observed as appropriate. All COVID precautions observed.     A:    DIAGNOSES:  27 y.o.  32w0d  Patient Active Problem List   Diagnosis   • Uses Croatian as primary spoken language   • Request for sterilization   • History of 2  sections: needs rpt.   • Gastroesophageal reflux disease with esophagitis without hemorrhage   • Vomiting or nausea of pregnancy   • Iron deficiency anemia during pregnancy     Diagnoses and all orders for this visit:    1. Request for sterilization (Primary)    2. History of 2  sections: needs rpt.    3. Iron deficiency anemia during pregnancy    4. Uses Croatian as primary spoken language    5. Gastroesophageal reflux disease with esophagitis without hemorrhage      Pregnancy Assessment : High Risk Pregnancy prior csections, iron deficiency anemia.GERC  NEW PROBLEMS? no    P:  Tests ordered for this or next visit: LABS: ua  New Meds:No  Return in about 2 weeks (around 2022) for ob  tummy.     I spent 20+ minutes caring for Linda on this date of service. This time includes time spent by me in the following activities: preparing for the visit, reviewing tests, obtaining and/or reviewing a separately obtained history, performing a medically appropriate examination and/or evaluation, counseling and educating the patient/family/caregiver, ordering medications, tests, or procedures, referring and communicating with other health care professionals, documenting information in the medical record, independently interpreting results and communicating that information with the patient/family/caregiver and care coordination      Pt instructed to call for results of any testing done today and that failure to call if she has not heard from us could result in a bad outcome.  Pt verbalized her understanding.     Morgan Guzman MD

## 2022-05-18 ENCOUNTER — ROUTINE PRENATAL (OUTPATIENT)
Dept: OBSTETRICS AND GYNECOLOGY | Facility: CLINIC | Age: 27
End: 2022-05-18

## 2022-05-18 VITALS — SYSTOLIC BLOOD PRESSURE: 98 MMHG | DIASTOLIC BLOOD PRESSURE: 64 MMHG | BODY MASS INDEX: 24.62 KG/M2 | WEIGHT: 139 LBS

## 2022-05-18 DIAGNOSIS — D50.9 IRON DEFICIENCY ANEMIA DURING PREGNANCY: ICD-10-CM

## 2022-05-18 DIAGNOSIS — K21.00 GASTROESOPHAGEAL REFLUX DISEASE WITH ESOPHAGITIS WITHOUT HEMORRHAGE: ICD-10-CM

## 2022-05-18 DIAGNOSIS — O99.019 IRON DEFICIENCY ANEMIA DURING PREGNANCY: ICD-10-CM

## 2022-05-18 DIAGNOSIS — Z30.2 REQUEST FOR STERILIZATION: ICD-10-CM

## 2022-05-18 DIAGNOSIS — Z98.891 HISTORY OF 2 CESAREAN SECTIONS: Primary | ICD-10-CM

## 2022-05-18 LAB
GLUCOSE UR STRIP-MCNC: NEGATIVE MG/DL
PROT UR STRIP-MCNC: NEGATIVE MG/DL

## 2022-05-18 PROCEDURE — 99213 OFFICE O/P EST LOW 20 MIN: CPT | Performed by: OBSTETRICS & GYNECOLOGY

## 2022-05-18 RX ORDER — CARBOPROST TROMETHAMINE 250 UG/ML
250 INJECTION, SOLUTION INTRAMUSCULAR AS NEEDED
Status: CANCELLED | OUTPATIENT
Start: 2022-05-18

## 2022-05-18 RX ORDER — LIDOCAINE HYDROCHLORIDE 10 MG/ML
5 INJECTION, SOLUTION EPIDURAL; INFILTRATION; INTRACAUDAL; PERINEURAL AS NEEDED
Status: CANCELLED | OUTPATIENT
Start: 2022-05-18

## 2022-05-18 RX ORDER — SODIUM CHLORIDE 0.9 % (FLUSH) 0.9 %
10 SYRINGE (ML) INJECTION EVERY 12 HOURS SCHEDULED
Status: CANCELLED | OUTPATIENT
Start: 2022-05-18

## 2022-05-18 RX ORDER — SODIUM CHLORIDE 0.9 % (FLUSH) 0.9 %
1-10 SYRINGE (ML) INJECTION AS NEEDED
Status: CANCELLED | OUTPATIENT
Start: 2022-05-18

## 2022-05-18 RX ORDER — MISOPROSTOL 100 UG/1
800 TABLET ORAL AS NEEDED
Status: CANCELLED | OUTPATIENT
Start: 2022-05-18

## 2022-05-18 RX ORDER — METHYLERGONOVINE MALEATE 0.2 MG/ML
200 INJECTION INTRAVENOUS ONCE AS NEEDED
Status: CANCELLED | OUTPATIENT
Start: 2022-05-18

## 2022-06-06 ENCOUNTER — ROUTINE PRENATAL (OUTPATIENT)
Dept: OBSTETRICS AND GYNECOLOGY | Facility: CLINIC | Age: 27
End: 2022-06-06

## 2022-06-06 VITALS — BODY MASS INDEX: 24.8 KG/M2 | SYSTOLIC BLOOD PRESSURE: 100 MMHG | WEIGHT: 140 LBS | DIASTOLIC BLOOD PRESSURE: 62 MMHG

## 2022-06-06 DIAGNOSIS — O99.019 IRON DEFICIENCY ANEMIA DURING PREGNANCY: ICD-10-CM

## 2022-06-06 DIAGNOSIS — K21.00 GASTROESOPHAGEAL REFLUX DISEASE WITH ESOPHAGITIS WITHOUT HEMORRHAGE: ICD-10-CM

## 2022-06-06 DIAGNOSIS — D50.9 IRON DEFICIENCY ANEMIA DURING PREGNANCY: ICD-10-CM

## 2022-06-06 DIAGNOSIS — Z30.2 REQUEST FOR STERILIZATION: ICD-10-CM

## 2022-06-06 DIAGNOSIS — D64.9 ANEMIA, UNSPECIFIED TYPE: Primary | ICD-10-CM

## 2022-06-06 DIAGNOSIS — Z36.85 SCREENING, ANTENATAL, FOR STREPTOCOCCUS B: ICD-10-CM

## 2022-06-06 DIAGNOSIS — Z98.891 HISTORY OF 2 CESAREAN SECTIONS: ICD-10-CM

## 2022-06-06 LAB
GLUCOSE UR STRIP-MCNC: NEGATIVE MG/DL
PROT UR STRIP-MCNC: NEGATIVE MG/DL

## 2022-06-06 PROCEDURE — 99214 OFFICE O/P EST MOD 30 MIN: CPT | Performed by: OBSTETRICS & GYNECOLOGY

## 2022-06-06 NOTE — PROGRESS NOTES
OB follow up     Linda Escobedo is a 27 y.o.  37w0d being seen today for her obstetrical visit.  Patient reports no complaints. Fetal movement: normal.    Review of Systems  No bleeding, No cramping/contractions     /62   Wt 63.5 kg (140 lb)   LMP 2021   BMI 24.80 kg/m²     FHT: 155 BPM   Uterine Size: 36  cm     50 %, 1 cm, -3 station.    Assessment/Plan:    1) 27 y.o.  -pregnancy at 37w0d- collect GBS    2) COVID19 precautions were reviewed with the patient. Continue to encourage social distancing, wearing a mask, and good hand hygiene.  I wore a mask, protective eye wear, and gloves during this patient encounter.  Patient also wearing a surgical mask and social distancing was observed. Hand hygeine performed before and after seeing the patient. Info provided on Covid vaccine: S/P COVID vaccine. Rec Booster vaccine if she is >6 months post vaccine.      3) H/O  x 2- Plan repeat @ 39 weeks     4) Venezuelan speaking-  used      5) Nausea and vomiting- resolved.     6)  S/P flu vaccine       7)  Request for sterilization- Tubal consent signed     8)  + quad screen-  Screen + for Down Syndrome. [id number] neg.      9) Anemia- Taking iron daily. Check CBC today.     10) S/P Tdap vaccine      11) GERD- on  Pepcid BID      12)Reviewed this stage of pregnancy    13)Problem list updated     14) RTO 1 week OB int.      Kelsi Pratt MD    2022  16:05 EDT

## 2022-06-07 LAB
BASOPHILS # BLD AUTO: 0 X10E3/UL (ref 0–0.2)
BASOPHILS NFR BLD AUTO: 0 %
EOSINOPHIL # BLD AUTO: 0 X10E3/UL (ref 0–0.4)
EOSINOPHIL NFR BLD AUTO: 1 %
ERYTHROCYTE [DISTWIDTH] IN BLOOD BY AUTOMATED COUNT: 13.6 % (ref 11.7–15.4)
HCT VFR BLD AUTO: 29.6 % (ref 34–46.6)
HGB BLD-MCNC: 9.9 G/DL (ref 11.1–15.9)
IMM GRANULOCYTES # BLD AUTO: 0.1 X10E3/UL (ref 0–0.1)
IMM GRANULOCYTES NFR BLD AUTO: 1 %
LYMPHOCYTES # BLD AUTO: 1.8 X10E3/UL (ref 0.7–3.1)
LYMPHOCYTES NFR BLD AUTO: 21 %
MCH RBC QN AUTO: 30.2 PG (ref 26.6–33)
MCHC RBC AUTO-ENTMCNC: 33.4 G/DL (ref 31.5–35.7)
MCV RBC AUTO: 90 FL (ref 79–97)
MONOCYTES # BLD AUTO: 0.6 X10E3/UL (ref 0.1–0.9)
MONOCYTES NFR BLD AUTO: 7 %
NEUTROPHILS # BLD AUTO: 6.2 X10E3/UL (ref 1.4–7)
NEUTROPHILS NFR BLD AUTO: 70 %
PLATELET # BLD AUTO: 234 X10E3/UL (ref 150–450)
RBC # BLD AUTO: 3.28 X10E6/UL (ref 3.77–5.28)
WBC # BLD AUTO: 8.6 X10E3/UL (ref 3.4–10.8)

## 2022-06-08 LAB — GP B STREP DNA SPEC QL NAA+PROBE: NEGATIVE

## 2022-06-12 PROBLEM — Z30.2 REQUEST FOR STERILIZATION: Status: ACTIVE | Noted: 2022-06-12

## 2022-06-13 ENCOUNTER — ROUTINE PRENATAL (OUTPATIENT)
Dept: OBSTETRICS AND GYNECOLOGY | Facility: CLINIC | Age: 27
End: 2022-06-13

## 2022-06-13 VITALS — SYSTOLIC BLOOD PRESSURE: 110 MMHG | WEIGHT: 142 LBS | BODY MASS INDEX: 25.15 KG/M2 | DIASTOLIC BLOOD PRESSURE: 68 MMHG

## 2022-06-13 DIAGNOSIS — Z34.93 PRENATAL CARE IN THIRD TRIMESTER: Primary | ICD-10-CM

## 2022-06-13 LAB
GLUCOSE UR STRIP-MCNC: NEGATIVE MG/DL
PROT UR STRIP-MCNC: NEGATIVE MG/DL

## 2022-06-13 PROCEDURE — 99213 OFFICE O/P EST LOW 20 MIN: CPT | Performed by: NURSE PRACTITIONER

## 2022-06-13 NOTE — PROGRESS NOTES
OB follow up     Linda Escobedo is a 27 y.o.  38w0d being seen today for her obstetrical visit.  Patient reports no complaints. Fetal movement: normal.    Review of Systems  No bleeding, No cramping/contractions     /68   Wt 64.4 kg (142 lb)   LMP 2021   BMI 25.15 kg/m²     FHT: 140s  BPM   Uterine Size: 36 cm      50 %, 1 cm, -3 station.    Assessment/Plan:    1) 27 y.o.  -pregnancy at 38w0d- GBS negative     2) COVID19 precautions were reviewed with the patient. Continue to encourage social distancing, wearing a mask, and good hand hygiene.  I wore a mask, protective eye wear, and gloves during this patient encounter.  Patient also wearing a surgical mask and social distancing was observed. Hand hygeine performed before and after seeing the patient. Info provided on Covid vaccine: S/P COVID vaccine. Rec Booster vaccine if she is >6 months post vaccine.      3) H/O  x 2- Plan repeat @ 39 weeks. Has repeat  scheduled for 22.      4) [place] speaking-  used      5) Nausea and vomiting- resolved.     6)  S/P flu vaccine       7)  Request for sterilization- Tubal consent signed     8)  + quad screen-  Screen + for Down Syndrome. [id number] neg.      9) Anemia- Continue iron BID or TID.  Hgb 9.9g/dL.      10) S/P Tdap vaccine      11) Heartburn- on  Pepcid BID     12) Ped- [facility].  Breast and bottle. Female.      Reviewed this stage of pregnancy  Problem list updated   Has scheduled  upcoming        Eve Maradiaga, AMPARO  2022  16:14 EDT

## 2022-06-20 ENCOUNTER — ANESTHESIA EVENT (OUTPATIENT)
Dept: OBSTETRICS AND GYNECOLOGY | Facility: HOSPITAL | Age: 27
End: 2022-06-20

## 2022-06-20 ENCOUNTER — ANESTHESIA (OUTPATIENT)
Dept: OBSTETRICS AND GYNECOLOGY | Facility: HOSPITAL | Age: 27
End: 2022-06-20

## 2022-06-20 ENCOUNTER — HOSPITAL ENCOUNTER (INPATIENT)
Facility: HOSPITAL | Age: 27
LOS: 2 days | Discharge: HOME | End: 2022-06-22
Attending: OBSTETRICS & GYNECOLOGY | Admitting: OBSTETRICS & GYNECOLOGY

## 2022-06-20 DIAGNOSIS — Z98.891 PREVIOUS CESAREAN SECTION: Primary | ICD-10-CM

## 2022-06-20 DIAGNOSIS — Z98.891 STATUS POST REPEAT LOW TRANSVERSE CESAREAN SECTION: ICD-10-CM

## 2022-06-20 DIAGNOSIS — Z98.891 HISTORY OF 2 CESAREAN SECTIONS: ICD-10-CM

## 2022-06-20 LAB
ABO GROUP BLD: NORMAL
ABO GROUP BLD: NORMAL
AMPHET+METHAMPHET UR QL: NEGATIVE
AMPHETAMINES UR QL: NEGATIVE
BACTERIA UR QL AUTO: ABNORMAL /HPF
BARBITURATES UR QL SCN: NEGATIVE
BENZODIAZ UR QL SCN: NEGATIVE
BILIRUB UR QL STRIP: NEGATIVE
BLD GP AB SCN SERPL QL: NEGATIVE
BUPRENORPHINE SERPL-MCNC: NEGATIVE NG/ML
CANNABINOIDS SERPL QL: NEGATIVE
CLARITY UR: ABNORMAL
COCAINE UR QL: NEGATIVE
COLOR UR: YELLOW
DEPRECATED RDW RBC AUTO: 48.5 FL (ref 37–54)
ERYTHROCYTE [DISTWIDTH] IN BLOOD BY AUTOMATED COUNT: 14 % (ref 12.3–15.4)
GLUCOSE UR STRIP-MCNC: NEGATIVE MG/DL
HCT VFR BLD AUTO: 33.1 % (ref 34–46.6)
HGB BLD-MCNC: 10.7 G/DL (ref 12–15.9)
HGB UR QL STRIP.AUTO: ABNORMAL
HYALINE CASTS UR QL AUTO: ABNORMAL /LPF
KETONES UR QL STRIP: NEGATIVE
LEUKOCYTE ESTERASE UR QL STRIP.AUTO: ABNORMAL
MCH RBC QN AUTO: 30.2 PG (ref 26.6–33)
MCHC RBC AUTO-ENTMCNC: 32.3 G/DL (ref 31.5–35.7)
MCV RBC AUTO: 93.5 FL (ref 79–97)
METHADONE UR QL SCN: NEGATIVE
NITRITE UR QL STRIP: NEGATIVE
OPIATES UR QL: NEGATIVE
OXYCODONE UR QL SCN: NEGATIVE
PCP UR QL SCN: NEGATIVE
PH UR STRIP.AUTO: 7 [PH] (ref 4.5–8)
PLATELET # BLD AUTO: 212 10*3/MM3 (ref 140–450)
PMV BLD AUTO: 12.3 FL (ref 6–12)
PROPOXYPH UR QL: NEGATIVE
PROT UR QL STRIP: ABNORMAL
RBC # BLD AUTO: 3.54 10*6/MM3 (ref 3.77–5.28)
RBC # UR STRIP: ABNORMAL /HPF
REF LAB TEST METHOD: ABNORMAL
RH BLD: POSITIVE
RH BLD: POSITIVE
SARS-COV-2 RNA PNL SPEC NAA+PROBE: NOT DETECTED
SP GR UR STRIP: 1.02 (ref 1–1.03)
SQUAMOUS #/AREA URNS HPF: ABNORMAL /HPF
T&S EXPIRATION DATE: NORMAL
TRICYCLICS UR QL SCN: NEGATIVE
UROBILINOGEN UR QL STRIP: ABNORMAL
WBC # UR STRIP: ABNORMAL /HPF
WBC NRBC COR # BLD: 8.04 10*3/MM3 (ref 3.4–10.8)

## 2022-06-20 PROCEDURE — 86900 BLOOD TYPING SEROLOGIC ABO: CPT | Performed by: OBSTETRICS & GYNECOLOGY

## 2022-06-20 PROCEDURE — 59514 CESAREAN DELIVERY ONLY: CPT | Performed by: SPECIALIST/TECHNOLOGIST, OTHER

## 2022-06-20 PROCEDURE — 85027 COMPLETE CBC AUTOMATED: CPT | Performed by: OBSTETRICS & GYNECOLOGY

## 2022-06-20 PROCEDURE — 87635 SARS-COV-2 COVID-19 AMP PRB: CPT | Performed by: OBSTETRICS & GYNECOLOGY

## 2022-06-20 PROCEDURE — 86901 BLOOD TYPING SEROLOGIC RH(D): CPT

## 2022-06-20 PROCEDURE — 0 CEFAZOLIN SODIUM-DEXTROSE 2-3 GM-%(50ML) RECONSTITUTED SOLUTION: Performed by: OBSTETRICS & GYNECOLOGY

## 2022-06-20 PROCEDURE — 81001 URINALYSIS AUTO W/SCOPE: CPT | Performed by: OBSTETRICS & GYNECOLOGY

## 2022-06-20 PROCEDURE — 25010000002 KETOROLAC TROMETHAMINE PER 15 MG: Performed by: OBSTETRICS & GYNECOLOGY

## 2022-06-20 PROCEDURE — 25010000002 PHENYLEPHRINE 10 MG/ML SOLUTION: Performed by: NURSE ANESTHETIST, CERTIFIED REGISTERED

## 2022-06-20 PROCEDURE — 25010000002 KETOROLAC TROMETHAMINE PER 15 MG: Performed by: NURSE ANESTHETIST, CERTIFIED REGISTERED

## 2022-06-20 PROCEDURE — 86900 BLOOD TYPING SEROLOGIC ABO: CPT

## 2022-06-20 PROCEDURE — 25010000002 ONDANSETRON PER 1 MG: Performed by: NURSE ANESTHETIST, CERTIFIED REGISTERED

## 2022-06-20 PROCEDURE — 86901 BLOOD TYPING SEROLOGIC RH(D): CPT | Performed by: OBSTETRICS & GYNECOLOGY

## 2022-06-20 PROCEDURE — 59515 CESAREAN DELIVERY: CPT | Performed by: OBSTETRICS & GYNECOLOGY

## 2022-06-20 PROCEDURE — 58611 LIGATE OVIDUCT(S) ADD-ON: CPT | Performed by: OBSTETRICS & GYNECOLOGY

## 2022-06-20 PROCEDURE — 88302 TISSUE EXAM BY PATHOLOGIST: CPT | Performed by: OBSTETRICS & GYNECOLOGY

## 2022-06-20 PROCEDURE — 80306 DRUG TEST PRSMV INSTRMNT: CPT | Performed by: OBSTETRICS & GYNECOLOGY

## 2022-06-20 PROCEDURE — 0UB70ZZ EXCISION OF BILATERAL FALLOPIAN TUBES, OPEN APPROACH: ICD-10-PCS | Performed by: OBSTETRICS & GYNECOLOGY

## 2022-06-20 PROCEDURE — 25010000002 ONDANSETRON PER 1 MG: Performed by: OBSTETRICS & GYNECOLOGY

## 2022-06-20 PROCEDURE — 86850 RBC ANTIBODY SCREEN: CPT | Performed by: OBSTETRICS & GYNECOLOGY

## 2022-06-20 PROCEDURE — 58611 LIGATE OVIDUCT(S) ADD-ON: CPT | Performed by: SPECIALIST/TECHNOLOGIST, OTHER

## 2022-06-20 PROCEDURE — 25010000002 MORPHINE PER 10 MG: Performed by: NURSE ANESTHETIST, CERTIFIED REGISTERED

## 2022-06-20 RX ORDER — METHYLERGONOVINE MALEATE 0.2 MG/ML
200 INJECTION INTRAVENOUS ONCE AS NEEDED
Status: DISCONTINUED | OUTPATIENT
Start: 2022-06-20 | End: 2022-06-22 | Stop reason: HOSPADM

## 2022-06-20 RX ORDER — DIPHENHYDRAMINE HCL 25 MG
25 CAPSULE ORAL EVERY 4 HOURS PRN
Status: DISCONTINUED | OUTPATIENT
Start: 2022-06-20 | End: 2022-06-22 | Stop reason: HOSPADM

## 2022-06-20 RX ORDER — CEFAZOLIN SODIUM 2 G/50ML
2 SOLUTION INTRAVENOUS ONCE
Status: COMPLETED | OUTPATIENT
Start: 2022-06-20 | End: 2022-06-20

## 2022-06-20 RX ORDER — MORPHINE SULFATE 1 MG/ML
INJECTION, SOLUTION EPIDURAL; INTRATHECAL; INTRAVENOUS AS NEEDED
Status: DISCONTINUED | OUTPATIENT
Start: 2022-06-20 | End: 2022-06-20 | Stop reason: SURG

## 2022-06-20 RX ORDER — ONDANSETRON 2 MG/ML
INJECTION INTRAMUSCULAR; INTRAVENOUS AS NEEDED
Status: DISCONTINUED | OUTPATIENT
Start: 2022-06-20 | End: 2022-06-20 | Stop reason: SURG

## 2022-06-20 RX ORDER — LIDOCAINE HYDROCHLORIDE 10 MG/ML
5 INJECTION, SOLUTION EPIDURAL; INFILTRATION; INTRACAUDAL; PERINEURAL AS NEEDED
Status: DISCONTINUED | OUTPATIENT
Start: 2022-06-20 | End: 2022-06-20

## 2022-06-20 RX ORDER — PROMETHAZINE HYDROCHLORIDE 25 MG/1
25 TABLET ORAL EVERY 6 HOURS PRN
Status: DISCONTINUED | OUTPATIENT
Start: 2022-06-20 | End: 2022-06-22 | Stop reason: HOSPADM

## 2022-06-20 RX ORDER — DIPHENHYDRAMINE HYDROCHLORIDE 50 MG/ML
25 INJECTION INTRAMUSCULAR; INTRAVENOUS EVERY 4 HOURS PRN
Status: DISCONTINUED | OUTPATIENT
Start: 2022-06-20 | End: 2022-06-22 | Stop reason: HOSPADM

## 2022-06-20 RX ORDER — PHENYLEPHRINE HYDROCHLORIDE 10 MG/ML
INJECTION INTRAVENOUS AS NEEDED
Status: DISCONTINUED | OUTPATIENT
Start: 2022-06-20 | End: 2022-06-20 | Stop reason: SURG

## 2022-06-20 RX ORDER — KETOROLAC TROMETHAMINE 30 MG/ML
INJECTION, SOLUTION INTRAMUSCULAR; INTRAVENOUS AS NEEDED
Status: DISCONTINUED | OUTPATIENT
Start: 2022-06-20 | End: 2022-06-20 | Stop reason: SURG

## 2022-06-20 RX ORDER — PROMETHAZINE HYDROCHLORIDE 25 MG/1
12.5 SUPPOSITORY RECTAL EVERY 6 HOURS PRN
Status: DISCONTINUED | OUTPATIENT
Start: 2022-06-20 | End: 2022-06-22 | Stop reason: HOSPADM

## 2022-06-20 RX ORDER — MISOPROSTOL 200 UG/1
800 TABLET ORAL AS NEEDED
Status: DISCONTINUED | OUTPATIENT
Start: 2022-06-20 | End: 2022-06-22 | Stop reason: HOSPADM

## 2022-06-20 RX ORDER — FAMOTIDINE 20 MG/1
20 TABLET, FILM COATED ORAL DAILY
Status: DISCONTINUED | OUTPATIENT
Start: 2022-06-20 | End: 2022-06-22 | Stop reason: HOSPADM

## 2022-06-20 RX ORDER — OXYCODONE HYDROCHLORIDE AND ACETAMINOPHEN 5; 325 MG/1; MG/1
1 TABLET ORAL EVERY 4 HOURS PRN
Status: ACTIVE | OUTPATIENT
Start: 2022-06-20 | End: 2022-06-21

## 2022-06-20 RX ORDER — SODIUM CHLORIDE, SODIUM LACTATE, POTASSIUM CHLORIDE, CALCIUM CHLORIDE 600; 310; 30; 20 MG/100ML; MG/100ML; MG/100ML; MG/100ML
125 INJECTION, SOLUTION INTRAVENOUS CONTINUOUS
Status: DISCONTINUED | OUTPATIENT
Start: 2022-06-20 | End: 2022-06-22 | Stop reason: HOSPADM

## 2022-06-20 RX ORDER — CARBOPROST TROMETHAMINE 250 UG/ML
250 INJECTION, SOLUTION INTRAMUSCULAR AS NEEDED
Status: DISCONTINUED | OUTPATIENT
Start: 2022-06-20 | End: 2022-06-22 | Stop reason: HOSPADM

## 2022-06-20 RX ORDER — ONDANSETRON 2 MG/ML
4 INJECTION INTRAMUSCULAR; INTRAVENOUS ONCE AS NEEDED
Status: DISPENSED | OUTPATIENT
Start: 2022-06-20 | End: 2022-06-21

## 2022-06-20 RX ORDER — HYDROCODONE BITARTRATE AND ACETAMINOPHEN 5; 325 MG/1; MG/1
2 TABLET ORAL EVERY 4 HOURS PRN
Status: DISCONTINUED | OUTPATIENT
Start: 2022-06-21 | End: 2022-06-22 | Stop reason: HOSPADM

## 2022-06-20 RX ORDER — SIMETHICONE 80 MG
80 TABLET,CHEWABLE ORAL 4 TIMES DAILY PRN
Status: DISCONTINUED | OUTPATIENT
Start: 2022-06-20 | End: 2022-06-22 | Stop reason: HOSPADM

## 2022-06-20 RX ORDER — FERROUS GLUCONATE 324(37.5)
324 TABLET ORAL 2 TIMES DAILY
Status: DISCONTINUED | OUTPATIENT
Start: 2022-06-20 | End: 2022-06-22 | Stop reason: HOSPADM

## 2022-06-20 RX ORDER — SODIUM CHLORIDE 0.9 % (FLUSH) 0.9 %
10 SYRINGE (ML) INJECTION EVERY 12 HOURS SCHEDULED
Status: DISCONTINUED | OUTPATIENT
Start: 2022-06-20 | End: 2022-06-20

## 2022-06-20 RX ORDER — OXYTOCIN/0.9 % SODIUM CHLORIDE 30/500 ML
85 PLASTIC BAG, INJECTION (ML) INTRAVENOUS ONCE
Status: COMPLETED | OUTPATIENT
Start: 2022-06-20 | End: 2022-06-20

## 2022-06-20 RX ORDER — OXYTOCIN/0.9 % SODIUM CHLORIDE 30/500 ML
650 PLASTIC BAG, INJECTION (ML) INTRAVENOUS ONCE
Status: DISCONTINUED | OUTPATIENT
Start: 2022-06-20 | End: 2022-06-22 | Stop reason: HOSPADM

## 2022-06-20 RX ORDER — OXYCODONE AND ACETAMINOPHEN 10; 325 MG/1; MG/1
1 TABLET ORAL EVERY 4 HOURS PRN
Status: ACTIVE | OUTPATIENT
Start: 2022-06-20 | End: 2022-06-21

## 2022-06-20 RX ORDER — SODIUM CHLORIDE 0.9 % (FLUSH) 0.9 %
1-10 SYRINGE (ML) INJECTION AS NEEDED
Status: DISCONTINUED | OUTPATIENT
Start: 2022-06-20 | End: 2022-06-20

## 2022-06-20 RX ORDER — OXYTOCIN/0.9 % SODIUM CHLORIDE 30/500 ML
85 PLASTIC BAG, INJECTION (ML) INTRAVENOUS ONCE
Status: DISCONTINUED | OUTPATIENT
Start: 2022-06-20 | End: 2022-06-22 | Stop reason: HOSPADM

## 2022-06-20 RX ORDER — OXYTOCIN/0.9 % SODIUM CHLORIDE 30/500 ML
PLASTIC BAG, INJECTION (ML) INTRAVENOUS
Status: COMPLETED
Start: 2022-06-20 | End: 2022-06-20

## 2022-06-20 RX ORDER — PRENATAL VIT/IRON FUM/FOLIC AC 27MG-0.8MG
1 TABLET ORAL DAILY
Status: DISCONTINUED | OUTPATIENT
Start: 2022-06-20 | End: 2022-06-22 | Stop reason: HOSPADM

## 2022-06-20 RX ORDER — HYDROMORPHONE HCL 110MG/55ML
0.5 PATIENT CONTROLLED ANALGESIA SYRINGE INTRAVENOUS
Status: ACTIVE | OUTPATIENT
Start: 2022-06-20 | End: 2022-06-21

## 2022-06-20 RX ORDER — KETOROLAC TROMETHAMINE 30 MG/ML
30 INJECTION, SOLUTION INTRAMUSCULAR; INTRAVENOUS EVERY 6 HOURS
Status: DISPENSED | OUTPATIENT
Start: 2022-06-20 | End: 2022-06-21

## 2022-06-20 RX ORDER — OXYTOCIN/0.9 % SODIUM CHLORIDE 30/500 ML
PLASTIC BAG, INJECTION (ML) INTRAVENOUS CONTINUOUS PRN
Status: DISCONTINUED | OUTPATIENT
Start: 2022-06-20 | End: 2022-06-20 | Stop reason: SURG

## 2022-06-20 RX ORDER — BUPIVACAINE HYDROCHLORIDE 7.5 MG/ML
INJECTION, SOLUTION EPIDURAL; RETROBULBAR
Status: COMPLETED | OUTPATIENT
Start: 2022-06-20 | End: 2022-06-20

## 2022-06-20 RX ORDER — OXYTOCIN/0.9 % SODIUM CHLORIDE 30/500 ML
650 PLASTIC BAG, INJECTION (ML) INTRAVENOUS ONCE
Status: COMPLETED | OUTPATIENT
Start: 2022-06-20 | End: 2022-06-20

## 2022-06-20 RX ADMIN — KETOROLAC TROMETHAMINE 30 MG: 30 INJECTION, SOLUTION INTRAMUSCULAR; INTRAVENOUS at 14:54

## 2022-06-20 RX ADMIN — Medication 125 ML/HR: at 14:35

## 2022-06-20 RX ADMIN — BUPIVACAINE HYDROCHLORIDE 1.6 ML: 7.5 INJECTION, SOLUTION EPIDURAL; RETROBULBAR at 14:13

## 2022-06-20 RX ADMIN — MORPHINE SULFATE 2 MG: 1 INJECTION, SOLUTION EPIDURAL; INTRATHECAL; INTRAVENOUS at 14:40

## 2022-06-20 RX ADMIN — MORPHINE SULFATE 1 MG: 1 INJECTION, SOLUTION EPIDURAL; INTRATHECAL; INTRAVENOUS at 14:46

## 2022-06-20 RX ADMIN — CEFAZOLIN SODIUM 2 G: 2 SOLUTION INTRAVENOUS at 14:17

## 2022-06-20 RX ADMIN — OXYTOCIN-SODIUM CHLORIDE 0.9% IV SOLN 30 UNIT/500ML 650 ML/HR: 30-0.9/5 SOLUTION at 14:36

## 2022-06-20 RX ADMIN — PHENYLEPHRINE HYDROCHLORIDE 50 MCG: 10 INJECTION INTRAVENOUS at 14:17

## 2022-06-20 RX ADMIN — MORPHINE SULFATE 200 MCG: 1 INJECTION, SOLUTION EPIDURAL; INTRATHECAL; INTRAVENOUS at 14:13

## 2022-06-20 RX ADMIN — SODIUM CHLORIDE, POTASSIUM CHLORIDE, SODIUM LACTATE AND CALCIUM CHLORIDE 1000 ML: 600; 310; 30; 20 INJECTION, SOLUTION INTRAVENOUS at 12:25

## 2022-06-20 RX ADMIN — SODIUM CHLORIDE, POTASSIUM CHLORIDE, SODIUM LACTATE AND CALCIUM CHLORIDE 125 ML/HR: 600; 310; 30; 20 INJECTION, SOLUTION INTRAVENOUS at 17:20

## 2022-06-20 RX ADMIN — SODIUM CHLORIDE, POTASSIUM CHLORIDE, SODIUM LACTATE AND CALCIUM CHLORIDE 125 ML/HR: 600; 310; 30; 20 INJECTION, SOLUTION INTRAVENOUS at 14:00

## 2022-06-20 RX ADMIN — PHENYLEPHRINE HYDROCHLORIDE 50 MCG: 10 INJECTION INTRAVENOUS at 14:14

## 2022-06-20 RX ADMIN — MORPHINE SULFATE 1 MG: 1 INJECTION, SOLUTION EPIDURAL; INTRATHECAL; INTRAVENOUS at 14:47

## 2022-06-20 RX ADMIN — KETOROLAC TROMETHAMINE 30 MG: 30 INJECTION, SOLUTION INTRAMUSCULAR; INTRAVENOUS at 21:18

## 2022-06-20 RX ADMIN — ONDANSETRON 4 MG: 2 INJECTION INTRAMUSCULAR; INTRAVENOUS at 17:00

## 2022-06-20 RX ADMIN — OXYTOCIN-SODIUM CHLORIDE 0.9% IV SOLN 30 UNIT/500ML 85 ML/HR: 30-0.9/5 SOLUTION at 16:55

## 2022-06-20 RX ADMIN — Medication 650 ML/HR: at 14:36

## 2022-06-20 RX ADMIN — ONDANSETRON 4 MG: 2 INJECTION INTRAMUSCULAR; INTRAVENOUS at 14:07

## 2022-06-20 NOTE — ANESTHESIA PREPROCEDURE EVALUATION
Anesthesia Evaluation     Patient summary reviewed and Nursing notes reviewed   no history of anesthetic complications:  NPO Solid Status: > 8 hours  NPO Liquid Status: > 2 hours           Airway   Mallampati: II  TM distance: >3 FB  Neck ROM: full  No difficulty expected  Dental - normal exam     Pulmonary - negative pulmonary ROS and normal exam   (-) not a smoker  Cardiovascular - negative cardio ROS  Exercise tolerance: good (4-7 METS)    Rhythm: regular  Rate: normal        Neuro/Psych- negative ROS  GI/Hepatic/Renal/Endo    (+)  GERD well controlled,      Musculoskeletal (-) negative ROS    Abdominal    Substance History - negative use  (-) alcohol use     OB/GYN    (+) Pregnant,         Other   blood dyscrasia anemia,                   Anesthesia Plan    ASA 2     spinal     intravenous induction     Anesthetic plan, risks, benefits, and alternatives have been provided, discussed and informed consent has been obtained with: patient.  Use of blood products discussed with patient  Consented to blood products.       CODE STATUS:

## 2022-06-20 NOTE — ANESTHESIA POSTPROCEDURE EVALUATION
Patient: Linda Lynn Ramos    Procedure Summary     Date: 22 Room / Location: [facility] LABOR DELIVERY 1 /  [facility] LABOR DELIVERY    Anesthesia Start: 1407 Anesthesia Stop:     Procedure:  SECTION REPEAT, tubal ligation (N/A Abdomen) Diagnosis:       History of 2  sections      (History of 2  sections [Z98.891])    Surgeons: Peter Babb MD Provider: Jone Soliz CRNA    Anesthesia Type: spinal ASA Status: 2          Anesthesia Type: spinal    Vitals  Vitals Value Taken Time   /62 22 1526   Temp 97.9 °F (36.6 °C) 22 1515   Pulse 56 22 1526   Resp 18 22 1515   SpO2     Vitals shown include unvalidated device data.        Post Anesthesia Care and Evaluation    Patient location during evaluation: bedside  Patient participation: complete - patient participated  Level of consciousness: awake and alert  Pain score: 4  Pain management: adequate    Airway patency: patent  Anesthetic complications: No anesthetic complications  PONV Status: none  Cardiovascular status: acceptable  Respiratory status: acceptable  Hydration status: acceptable  No anesthesia care post op

## 2022-06-20 NOTE — OP NOTE
KELLY Huntley   Section Operative Note    Pre-Operative Dx:   1) 27 y.o.   2) IUP at 39 weeks  3) Indications for  section: 2 previous C/S  4) Desires permanent sterilization         Postoperative dx:    1.  Same     Procedure: , Low Transverse , Bilateral Tubal Ligation   Surgeon: Peter Pritchard     Assistant:  Philip Ward CFA.  Responsible for retracting, helping with suturing.  Expulsion of the fetus.  Delivery of the placenta, closing the skin incision.   Anesthesia: Spinal    EBL:   mls.  800 mls.         IV Fluids: 600 mls.   UOP: 100 mls.    I/O this shift:  In: 850 [I.V.:800; IV Piggyback:50]  Out: 250 [Urine:250]   Antibiotics: cefazolin (Ancef)     Infant:      Time of Delivery     Gender: female  infant    Weight: 2948 g (6 lb 8 oz)     Apgars: 8  @ 1 minute /     9  @ 5 minutes      Meconium:   Nuchal Cord:    no  no            Indication for C/Section:                        Prior C/S              Procedure Details:   Once all questions were answered, the patient was given IV antibiotics for ID prophylaxis. Pt was taken to the OR where spinal anesthesia was administered. A covington catheter was placed and hung to gravity for the duration of the procedure. SCD's were placed on the lower extremities bilaterally for DVT prophylaxis. Once the pt was prepped and draped and anesthesia was found to be adequate, a Pfannensteil skin incision was made on the abdomen and carried down to the fascia. The fascia was incised and extended with Bone scissors. Kocher clamps were placed on the superior and inferior aspect of the fascia and the rectus abdominal muscles were sharply and bluntly taken down. The rectus abdominal muscles were  in the midline and the periteoneum was entered and bluntly extended. A bladder blade was then inserted and a bladder flap was created. A low transverse incision was made on the uterus and bluntly extended. Artificial rupture of membranes  was performed with clear fluid noted. The fetal head was delivered followed by the rest of the body. Cord was clamped and cut and baby taken to the warmer. Cord blood was collected and the placenta was delivered. The uterus was exteriorized and cleared of all clots and debris. The uterine incision was repaired in 2 layers. The first layer was a running and locked fashion with 0-Vicryl and the second layer was an imbricating repair with 0-Vicryl suture. The uterus was firm and hemostatic.  Both tubes were pulled up into a knuckle and doubly ligated with chromic suture.  Portion of each tube was amputated.  The abdomen was irrigated and aspirated with warm normal saline. The uterus was placed back into the abdomen.  The tubal sites were inspected and found to be hemostatic.  The fascia was reapproximated with 0-vicryl suture. The subcutaneous layer was irrigated and cauterized as needed for hemostasis. The skin was reapproximated with 4-0 vicryl. Pt tolerated the procedure well. Mom and baby are stable and progressing well.      Complications:   None      Disposition:   Mother to Mother Baby/Postpartum  in stable condition currently.   Baby to NBN  in stable condition currently.       Peter Babb MD  6/20/2022  16:09 EDT

## 2022-06-20 NOTE — ANESTHESIA PROCEDURE NOTES
Spinal Block    Pre-sedation assessment completed: 6/20/2022 2:11 PM    Patient reassessed immediately prior to procedure    Start Time: 6/20/2022 2:11 PM  Stop Time: 6/20/2022 2:13 PM  Indication:at surgeon's request and post-op pain management  Performed By  CRNA/RICARDO: Jone Soliz CRNASRNA: John Corbin SRNA  Preanesthetic Checklist  Completed: patient identified, IV checked, site marked, risks and benefits discussed, surgical consent, monitors and equipment checked, pre-op evaluation and timeout performed  Spinal Block Prep:  Patient Position:sitting  Sterile Tech:cap, gloves and sterile barriers  Prep:Chloraprep  Patient Monitoring:EKG, continuous pulse oximetry and blood pressure monitoring  Spinal Block Procedure  Approach:midline  Guidance:landmark technique and palpation technique  Location:L3-L4  Needle Type:Sprotte  Needle Gauge:25 G  Placement of Spinal needle event:cerebrospinal fluid aspirated  Paresthesia: no  Fluid Appearance:clear  Medications: bupivacaine PF (MARCAINE) injection 0.75%, 1.6 mL  Med Administered at 6/20/2022 2:13 PM   Post Assessment  Patient Tolerance:patient tolerated the procedure well with no apparent complications  Complications no

## 2022-06-20 NOTE — PLAN OF CARE
Goal Outcome Evaluation:  Plan of Care Reviewed With: patient, spouse        Progress: no change  Outcome Evaluation: VSS, Denies pain @ present, Consents signed and questions answered with [facility] , IVF infusing, Supportive spouse @ BS

## 2022-06-20 NOTE — PLAN OF CARE
Goal Outcome Evaluation:  Plan of Care Reviewed With: patient, spouse        Progress: no change  Outcome Evaluation: VSS, Denies pain or discomfort @ present, c/o nausea with medication given with little relief, F/C to BSD, SCD's remains on, FF@ U with light rubra,  Supportive spouse @ bs.

## 2022-06-20 NOTE — PLAN OF CARE
Goal Outcome Evaluation:  Plan of Care Reviewed With: patient, spouse      Delivery of viable female infant, Transferred to PP room 1122 for recovery.

## 2022-06-20 NOTE — H&P
Patient Care Team:  System, Provider Not In as PCP - General    Chief complaint previous  section       HPI: 27-year-old  3 para 2-0-0-2 with 2 previous  presents at 39 0/7 weeks for repeat low-transverse  section.  In addition she desires permanent sterilization.  Prenatal care is complicated by anemia and she takes iron daily.  She reports active fetal movement, no vaginal bleeding and no loss of fluid.    ROS:   Constitutional: Negative for appetite change, fever and unexpected weight change.   Respiratory: Negative for cough and shortness of breath.    Cardiovascular: Negative for chest pain and palpitations.   Gastrointestinal: Negative for abdominal pain, constipation, diarrhea, nausea and vomiting.   Endocrine: Negative.    Genitourinary: Negative for dyspareunia, pelvic pain and vaginal discharge.   Skin: Negative.    Neurological: Negative for dizziness and headaches.   Hematological: Negative.    Psychiatric/Behavioral: Negative for dysphoric mood and sleep disturbance. The patient is not nervous/anxious.        PMH: History reviewed. No pertinent past medical history.      PSH: History reviewed. No pertinent surgical history.    SoHx:   Social History     Socioeconomic History   • Marital status:    Tobacco Use   • Smoking status: Never Smoker   Substance and Sexual Activity   • Alcohol use: Never   • Drug use: Never       FHx: No family history on file.    PGyn Hx: Deferred    POBHx: See HPI    Allergies: Patient has no known allergies.    Medications:   No current facility-administered medications on file prior to encounter.     Current Outpatient Medications on File Prior to Encounter   Medication Sig Dispense Refill   • acetaminophen (TYLENOL) 500 MG tablet Take 500 mg by mouth Every 6 (Six) Hours As Needed for Mild Pain .     • famotidine (Pepcid) 20 MG tablet Take 1 tablet by mouth Daily. 60 tablet 1   • ferrous gluconate (FERGON) 324 MG tablet Take 1 tablet  by mouth 2 (Two) Times a Day. 100 tablet 2   • melatonin 5 MG tablet tablet Take 10 mg by mouth.     • ondansetron (Zofran) 4 MG tablet Take 1 tablet by mouth Every 8 (Eight) Hours As Needed for Nausea or Vomiting. 30 tablet 2   • Prenatal Vit-Fe Fumarate-FA (prenatal vitamin 28-0.8) 28-0.8 MG tablet tablet                Vital Signs  Temp:  [98.7 °F (37.1 °C)] 98.7 °F (37.1 °C)  Heart Rate:  [67] 67  Resp:  [18] 18  BP: (98)/(55) 98/55    Physical Exam:  Constitutional: She is oriented to person, place, and time. She appears well-developed and well-nourished.   HENT:   Head: Normocephalic and atraumatic.   Cardiovascular: Normal rate and regular rhythm.    Pulmonary/Chest: Effort normal. No respiratory distress.   Abdominal: Soft. Bowel sounds are normal. There is no tenderness. There is no rebound and no guarding.   Musculoskeletal: Normal range of motion.   Neurological: She is alert and oriented to person, place, and time.   Skin: Skin is warm and dry.   Psychiatric: She has a normal mood and affect. Her behavior is normal. Judgment and thought content normal.   Nursing note and vitals reviewed.  Debilities/Disabilities Identified: None  Emotional Behavior: Appropriate    Labs: O+ blood type, GBS negative.  COVID pending      Assessment/Plan: IUP at 39-0/7 weeks, 2 previous C-sections, desires permanent sterilization, anemia of pregnancy    And repeat low-transverse  section bilateral tubal ligation.    Peter Babb MD  22  13:04 EDT

## 2022-06-21 LAB
ALBUMIN SERPL-MCNC: 2.9 G/DL (ref 3.5–5.2)
ALBUMIN/GLOB SERPL: 1 G/DL
ALP SERPL-CCNC: 160 U/L (ref 39–117)
ALT SERPL W P-5'-P-CCNC: 8 U/L (ref 1–33)
ANION GAP SERPL CALCULATED.3IONS-SCNC: 11.1 MMOL/L (ref 5–15)
AST SERPL-CCNC: 21 U/L (ref 1–32)
BASOPHILS # BLD AUTO: 0.02 10*3/MM3 (ref 0–0.2)
BASOPHILS NFR BLD AUTO: 0.2 % (ref 0–1.5)
BILIRUB SERPL-MCNC: 0.3 MG/DL (ref 0–1.2)
BUN SERPL-MCNC: 6 MG/DL (ref 6–20)
BUN/CREAT SERPL: 9.4 (ref 7–25)
CALCIUM SPEC-SCNC: 8.7 MG/DL (ref 8.6–10.5)
CHLORIDE SERPL-SCNC: 103 MMOL/L (ref 98–107)
CO2 SERPL-SCNC: 19.9 MMOL/L (ref 22–29)
CREAT SERPL-MCNC: 0.64 MG/DL (ref 0.57–1)
DEPRECATED RDW RBC AUTO: 47.4 FL (ref 37–54)
EGFRCR SERPLBLD CKD-EPI 2021: 122.9 ML/MIN/1.73
EOSINOPHIL # BLD AUTO: 0.02 10*3/MM3 (ref 0–0.4)
EOSINOPHIL NFR BLD AUTO: 0.2 % (ref 0.3–6.2)
ERYTHROCYTE [DISTWIDTH] IN BLOOD BY AUTOMATED COUNT: 14 % (ref 12.3–15.4)
GLOBULIN UR ELPH-MCNC: 3 GM/DL
GLUCOSE SERPL-MCNC: 79 MG/DL (ref 65–99)
HCT VFR BLD AUTO: 28.7 % (ref 34–46.6)
HGB BLD-MCNC: 9.2 G/DL (ref 12–15.9)
IMM GRANULOCYTES # BLD AUTO: 0.05 10*3/MM3 (ref 0–0.05)
IMM GRANULOCYTES NFR BLD AUTO: 0.4 % (ref 0–0.5)
LYMPHOCYTES # BLD AUTO: 1.84 10*3/MM3 (ref 0.7–3.1)
LYMPHOCYTES NFR BLD AUTO: 16.3 % (ref 19.6–45.3)
MCH RBC QN AUTO: 30.2 PG (ref 26.6–33)
MCHC RBC AUTO-ENTMCNC: 32.1 G/DL (ref 31.5–35.7)
MCV RBC AUTO: 94.1 FL (ref 79–97)
MONOCYTES # BLD AUTO: 0.55 10*3/MM3 (ref 0.1–0.9)
MONOCYTES NFR BLD AUTO: 4.9 % (ref 5–12)
NEUTROPHILS NFR BLD AUTO: 78 % (ref 42.7–76)
NEUTROPHILS NFR BLD AUTO: 8.84 10*3/MM3 (ref 1.7–7)
NRBC BLD AUTO-RTO: 0 /100 WBC (ref 0–0.2)
PLATELET # BLD AUTO: 180 10*3/MM3 (ref 140–450)
PMV BLD AUTO: 12.5 FL (ref 6–12)
POTASSIUM SERPL-SCNC: 4.3 MMOL/L (ref 3.5–5.2)
PROT SERPL-MCNC: 5.9 G/DL (ref 6–8.5)
RBC # BLD AUTO: 3.05 10*6/MM3 (ref 3.77–5.28)
SODIUM SERPL-SCNC: 134 MMOL/L (ref 136–145)
WBC NRBC COR # BLD: 11.32 10*3/MM3 (ref 3.4–10.8)

## 2022-06-21 PROCEDURE — 80053 COMPREHEN METABOLIC PANEL: CPT | Performed by: NURSE PRACTITIONER

## 2022-06-21 PROCEDURE — 85025 COMPLETE CBC W/AUTO DIFF WBC: CPT | Performed by: OBSTETRICS & GYNECOLOGY

## 2022-06-21 PROCEDURE — 25010000002 KETOROLAC TROMETHAMINE PER 15 MG: Performed by: OBSTETRICS & GYNECOLOGY

## 2022-06-21 PROCEDURE — 25010000002 ONDANSETRON PER 1 MG: Performed by: ANESTHESIOLOGY

## 2022-06-21 PROCEDURE — 0503F POSTPARTUM CARE VISIT: CPT | Performed by: NURSE PRACTITIONER

## 2022-06-21 PROCEDURE — 63710000001 PROMETHAZINE PER 25 MG: Performed by: OBSTETRICS & GYNECOLOGY

## 2022-06-21 RX ORDER — IBUPROFEN 800 MG/1
800 TABLET ORAL EVERY 8 HOURS PRN
Status: DISCONTINUED | OUTPATIENT
Start: 2022-06-21 | End: 2022-06-22 | Stop reason: HOSPADM

## 2022-06-21 RX ORDER — ONDANSETRON 2 MG/ML
4 INJECTION INTRAMUSCULAR; INTRAVENOUS EVERY 6 HOURS PRN
Status: DISPENSED | OUTPATIENT
Start: 2022-06-21 | End: 2022-06-22

## 2022-06-21 RX ADMIN — KETOROLAC TROMETHAMINE 30 MG: 30 INJECTION, SOLUTION INTRAMUSCULAR; INTRAVENOUS at 03:13

## 2022-06-21 RX ADMIN — HYDROCODONE BITARTRATE AND ACETAMINOPHEN 2 TABLET: 5; 325 TABLET ORAL at 16:30

## 2022-06-21 RX ADMIN — KETOROLAC TROMETHAMINE 30 MG: 30 INJECTION, SOLUTION INTRAMUSCULAR; INTRAVENOUS at 09:01

## 2022-06-21 RX ADMIN — Medication 324 MG: at 20:07

## 2022-06-21 RX ADMIN — ONDANSETRON 4 MG: 2 INJECTION INTRAMUSCULAR; INTRAVENOUS at 00:15

## 2022-06-21 RX ADMIN — ONDANSETRON 4 MG: 2 INJECTION INTRAMUSCULAR; INTRAVENOUS at 07:17

## 2022-06-21 RX ADMIN — PROMETHAZINE HYDROCHLORIDE 25 MG: 25 TABLET ORAL at 09:01

## 2022-06-21 RX ADMIN — HYDROCODONE BITARTRATE AND ACETAMINOPHEN 2 TABLET: 5; 325 TABLET ORAL at 23:16

## 2022-06-21 RX ADMIN — SODIUM CHLORIDE, POTASSIUM CHLORIDE, SODIUM LACTATE AND CALCIUM CHLORIDE 125 ML/HR: 600; 310; 30; 20 INJECTION, SOLUTION INTRAVENOUS at 12:24

## 2022-06-21 RX ADMIN — IBUPROFEN 800 MG: 800 TABLET, FILM COATED ORAL at 18:38

## 2022-06-21 RX ADMIN — FAMOTIDINE 20 MG: 20 TABLET ORAL at 09:48

## 2022-06-21 NOTE — PROGRESS NOTES
Patient: Linda Bray Gregg  Procedure(s):   SECTION REPEAT, tubal ligation  Anesthesia type: spinal    Patient location: Labor and Delivery  Last vitals:   Vitals:    22 0805   BP: 99/60   Pulse: 57   Resp: 15   Temp: 98 °F (36.7 °C)   SpO2: 97%     Level of consciousness: awake, alert and oriented    Post-anesthesia pain: adequate analgesia  Airway patency: patent  Respiratory: unassisted  Cardiovascular: stable and blood pressure at baseline  Hydration: euvolemic    Anesthetic complications: noPatient: Linda Escobedo  Procedure(s):   SECTION REPEAT, tubal ligation  Anesthesia type: spinal    Patient location: PACU  Last vitals:   Vitals:    22 0805   BP: 99/60   Pulse: 57   Resp: 15   Temp: 98 °F (36.7 °C)   SpO2: 97%     Level of consciousness: awake, alert and oriented    Post-anesthesia pain: adequate analgesia  Airway patency: patent  Respiratory: unassisted  Cardiovascular: stable and blood pressure at baseline  Hydration: euvolemic    Anesthetic complications: no

## 2022-06-21 NOTE — PROGRESS NOTES
KELLY [facility]   PROGRESS NOTE    Post-Op Day 1 S/P   Subjective   Subjective  Patient reports:  Pain is well controlled with IV toradol. .  She has ambulated x 1. Not Tolerating diet. She is not feeling well this morning. She has nausea and vomiting. She also appears very sleepy. She is oriented and answers questions appropriately but shuts her eyes and dozes between questions. Tolerating po -- decreased for liquids and decreased for solids.  Intake --c/o of nausea and vomiting.   Voiding -  x 1 since F/C removal  Flatus reported.  Vaginal bleeding is as much as expected. Her  is supportive at bedside.     Objective    Objective     Vitals: Vital Signs Range for the last 24 hours  Temperature: Temp:  [97.6 °F (36.4 °C)-98.7 °F (37.1 °C)] 98 °F (36.7 °C)   Temp Source: Temp src: Oral   BP: BP: ()/(45-70) 99/60   Pulse: Heart Rate:  [52-83] 57   Respirations: Resp:  [15-20] 15   SPO2: SpO2:  [97 %-100 %] 97 %   O2 Amount (l/min):     O2 Devices Device (Oxygen Therapy): nasal cannula with ETCO2   Weight: Weight:  [64.4 kg (142 lb)] 64.4 kg (142 lb)            Physical Exam    Lungs clear to auscultation bilaterally   Abdomen Soft, fundus firm, bowel sounds hypoactive   Incision  Dressing C/D/I    Extremities edema trace and Homans sign is negative, no sign of DVT     I reviewed the patient's new clinical results.    Assessment & Plan        History of 2  sections: needs rpt.    Previous  section    Assessment & Plan    Assessment:    Linda SHADY Jamaz Gregg is Day 1  post-partum  , Low Transverse   .      Plan:  1) Postop day #1- S/P RLTCS with BTL. Rh +. Hgb 9.2g/dL.      2) Postpartum care- Continue. Enc ambulation with assistance. Continue IV fluids.  Consider clear liquid diet for lunch.     3) Maternal anemia- Continue ferrous gluconate.     4) Female infant- Breast and bottle.     5) Nausea and vomiting- Being managed with pepcid, zofran and phenergan. Verbal  update given to Dr. Pratt and anesthesia. Check CMP.     6)  Dipso- Consider home tomorrow or Thursday       AMPARO Phillips  06/21/22  08:31 EDT

## 2022-06-22 ENCOUNTER — APPOINTMENT (OUTPATIENT)
Dept: ULTRASOUND IMAGING | Facility: HOSPITAL | Age: 27
End: 2022-06-22

## 2022-06-22 VITALS
HEART RATE: 56 BPM | RESPIRATION RATE: 16 BRPM | OXYGEN SATURATION: 97 % | WEIGHT: 142 LBS | HEIGHT: 63 IN | BODY MASS INDEX: 25.16 KG/M2 | SYSTOLIC BLOOD PRESSURE: 94 MMHG | DIASTOLIC BLOOD PRESSURE: 51 MMHG | TEMPERATURE: 98.6 F

## 2022-06-22 PROBLEM — N63.0 BREAST MASS IN FEMALE: Status: ACTIVE | Noted: 2022-06-22

## 2022-06-22 LAB
LAB AP CASE REPORT: NORMAL
PATH REPORT.FINAL DX SPEC: NORMAL
PATH REPORT.GROSS SPEC: NORMAL

## 2022-06-22 PROCEDURE — 76642 ULTRASOUND BREAST LIMITED: CPT

## 2022-06-22 PROCEDURE — 0503F POSTPARTUM CARE VISIT: CPT | Performed by: NURSE PRACTITIONER

## 2022-06-22 RX ORDER — HYDROCODONE BITARTRATE AND ACETAMINOPHEN 5; 325 MG/1; MG/1
2 TABLET ORAL EVERY 4 HOURS PRN
Qty: 20 TABLET | Refills: 0 | Status: SHIPPED | OUTPATIENT
Start: 2022-06-22 | End: 2022-06-28

## 2022-06-22 RX ORDER — IBUPROFEN 800 MG/1
800 TABLET ORAL EVERY 8 HOURS PRN
Qty: 30 TABLET | Refills: 0 | Status: SHIPPED | OUTPATIENT
Start: 2022-06-22

## 2022-06-22 RX ORDER — DOCUSATE SODIUM 100 MG/1
100 CAPSULE, LIQUID FILLED ORAL 2 TIMES DAILY
Qty: 60 CAPSULE | Refills: 1 | Status: SHIPPED | OUTPATIENT
Start: 2022-06-22

## 2022-06-22 RX ADMIN — IBUPROFEN 800 MG: 800 TABLET, FILM COATED ORAL at 08:07

## 2022-06-22 RX ADMIN — Medication 324 MG: at 08:08

## 2022-06-22 RX ADMIN — FAMOTIDINE 20 MG: 20 TABLET ORAL at 08:08

## 2022-06-22 RX ADMIN — PRENATAL VIT W/ FE FUMARATE-FA TAB 27-0.8 MG 1 TABLET: 27-0.8 TAB at 08:07

## 2022-06-22 RX ADMIN — HYDROCODONE BITARTRATE AND ACETAMINOPHEN 2 TABLET: 5; 325 TABLET ORAL at 08:07

## 2022-06-22 NOTE — DISCHARGE SUMMARY
"Obstetrical Discharge Form    Primary OB Clinician: ATTILA    Gestational Age: 39.0 weeks     Antepartum complications: GERD, anemia, h/o  x 2    Date of Delivery:  2022     Delivered By: Peter Pritchard     Delivery Type: repeat  section, low transverse incision    Tubal Ligation: done with c/section    Baby: Liveborn male, Apgars 8/9, weight 6 #, 8 oz,     Anesthesia: spinal    Intrapartum complications: None    Laceration: n/a      Feeding method: breast      Discharge Date: 2022; Discharge Time: 09:39 EDT    BP (!) 83/51 (BP Location: Left arm, Patient Position: Lying)   Pulse 54   Temp 98.3 °F (36.8 °C) (Oral)   Resp 16   Ht 160 cm (63\")   Wt 64.4 kg (142 lb)   LMP 2021   SpO2 98%   Breastfeeding Yes   BMI 25.15 kg/m²      Abd: soft, fundus firm, incision C/D/I, bowel sounds present  Ext: Trace edema, neg Marcellus's sign, no cords  Results from last 7 days   Lab Units 22  0531   HEMOGLOBIN g/dL 9.2*       Impression: 1) Postpartum day #2- S/P RLTCS. Pt requesting discharge to home today. Rh +. Hgb 9.2g/dL.     2) Postpartum anemia- Continue iron.     3) Female infant- Breast.     4) Contraception- S/P BTL    5) (L) Breast mass- Per Radiology phone call (no report to view yet) rec breast biopsy of the solid mass. Awaiting official report.     Plan:    Patient given written instruction sheet.  Follow-up appointment with [facility] in 2 weeks.    AMPARO Phillips  09:39 EDT  2022  "

## 2022-06-22 NOTE — NURSING NOTE
D/C instructions discussed w/ pt and spouse - both verbalized understanding and all questions answered.  Pt will call office to schedule 2 weeks f/u visit.  Pt aware of s/s to call Dr or return to hospital.  Pt aware of s/s of pp depression and when to call Dr/go to ER.  Pt denies any needs or concerns at this time.

## 2022-06-22 NOTE — CASE MANAGEMENT/SOCIAL WORK
Case Management Discharge Note      Final Note: Discharged home.         Selected Continued Care - Discharged on 6/22/2022 Admission date: 6/20/2022 - Discharge disposition: Home or Self Care    Destination    No services have been selected for the patient.              Durable Medical Equipment    No services have been selected for the patient.              Dialysis/Infusion    No services have been selected for the patient.              Home Medical Care    No services have been selected for the patient.              Therapy    No services have been selected for the patient.              Community Resources    No services have been selected for the patient.              Community & DME    No services have been selected for the patient.                       Final Discharge Disposition Code: 01 - home or self-care

## 2022-06-22 NOTE — PLAN OF CARE
Problem: Adult Inpatient Plan of Care  Goal: Plan of Care Review  Outcome: Met  Goal: Patient-Specific Goal (Individualized)  Outcome: Met  Goal: Absence of Hospital-Acquired Illness or Injury  Outcome: Met  Intervention: Identify and Manage Fall Risk  Recent Flowsheet Documentation  Taken 6/22/2022 0807 by Tanya Cruz RN  Safety Promotion/Fall Prevention:   safety round/check completed   room organization consistent   nonskid shoes/slippers when out of bed   clutter free environment maintained   assistive device/personal items within reach   activity supervised  Intervention: Prevent Skin Injury  Recent Flowsheet Documentation  Taken 6/22/2022 0807 by Tanya Cruz RN  Body Position: sitting up in bed  Intervention: Prevent and Manage VTE (Venous Thromboembolism) Risk  Recent Flowsheet Documentation  Taken 6/22/2022 0807 by Tanya Cruz RN  Activity Management: up ad martha  Intervention: Prevent Infection  Recent Flowsheet Documentation  Taken 6/22/2022 0807 by Tanya Cruz RN  Infection Prevention:   cohorting utilized   environmental surveillance performed   equipment surfaces disinfected   hand hygiene promoted   rest/sleep promoted   personal protective equipment utilized   single patient room provided   visitors restricted/screened  Goal: Optimal Comfort and Wellbeing  Outcome: Met  Intervention: Monitor Pain and Promote Comfort  Recent Flowsheet Documentation  Taken 6/22/2022 0807 by Tanya Cruz RN  Pain Management Interventions:   see MAR   pain management plan reviewed with patient/caregiver  Intervention: Provide Person-Centered Care  Recent Flowsheet Documentation  Taken 6/22/2022 0807 by Tanya Cruz RN  Trust Relationship/Rapport:   care explained   emotional support provided   choices provided   empathic listening provided   questions encouraged   questions answered   reassurance provided   thoughts/feelings acknowledged  Goal: Readiness for Transition of  Care  Outcome: Met     Problem: Adjustment to Role Transition (Postpartum  Delivery)  Goal: Successful Maternal Role Transition  Outcome: Met  Intervention: Support Maternal Role Transition  Recent Flowsheet Documentation  Taken 2022 by Tanya Cruz RN  Supportive Measures:   active listening utilized   decision-making supported   goal-setting facilitated   problem-solving facilitated   positive reinforcement provided   relaxation techniques promoted   self-care encouraged   verbalization of feelings encouraged  Parent/Child Attachment Promotion:   caring behavior modeled   interaction encouraged   face-to-face positioning promoted   parent/caregiver presence encouraged   participation in care promoted   positive reinforcement provided   rooming-in promoted     Problem: Bleeding (Postpartum  Delivery)  Goal: Hemostasis  Outcome: Met     Problem: Infection (Postpartum  Delivery)  Goal: Absence of Infection Signs and Symptoms  Outcome: Met     Problem: Pain (Postpartum  Delivery)  Goal: Acceptable Pain Control  Outcome: Met  Intervention: Prevent or Manage Pain  Recent Flowsheet Documentation  Taken 2022 by Tanya Cruz RN  Pain Management Interventions:   see MAR   pain management plan reviewed with patient/caregiver     Problem: Postoperative Nausea and Vomiting (Postpartum  Delivery)  Goal: Nausea and Vomiting Relief  Outcome: Met     Problem: Postoperative Urinary Retention (Postpartum  Delivery)  Goal: Effective Urinary Elimination  Outcome: Met     Problem: Skin Injury Risk Increased  Goal: Skin Health and Integrity  Outcome: Met   Goal Outcome Evaluation:         VSS, pain well controlled w/ prn pain meds, pt up ad martha voiding, urinating and showered, will get us of L breast for lump today then d/c home.

## 2022-06-22 NOTE — NURSING NOTE
Dr delgado at bedside to discuss need for pt to have a breast biopsy in 2 weeks.  Pt and spouse verbalized understanding that they will receive a call to let them know when to come to the hospital for the biopsy and further instruction.

## 2022-07-05 NOTE — PROGRESS NOTES
OB follow up     Linda Escobedo is a 27 y.o.  39w0d being seen today for her obstetrical visit.  Patient reports no bleeding, no contractions and no leaking. Fetal movement: normal.  Prenatal care complicated by 2 previous  sections and plans on a repeat at 39 weeks.  She also has iron deficiency anemia and takes iron daily.  She has GERD and is on Pepcid.  She is a [place] as a primary spoken language and a  was used throughout this visit.    Review of Systems  No bleeding, No cramping/contractions     BP 98/64   Wt 63 kg (139 lb)   LMP 2021   BMI 24.62 kg/m²     FHT: present BPM   Uterine Size: 34 cm       Assessment/Plan:    1) 27 y.o.  -pregnancy at 39w0d    2)   Encounter Diagnoses   Name Primary?   • History of 2  sections: needs rpt. Yes   • Request for sterilization    • Iron deficiency anemia during pregnancy    • Gastroesophageal reflux disease with esophagitis without hemorrhage    Continue iron and Pepcid.  GBS next visit.  Plan repeat low-transverse  section with tubal ligation at 39 weeks.    3) Reviewed this stage of pregnancy  4) Problem list updated     Return in about 2 weeks (around 2022) for OB INT, GBS.      Peter Babb MD    2022  09:37 EDT

## 2022-07-11 RX ORDER — FAMOTIDINE 20 MG/1
TABLET, FILM COATED ORAL
Qty: 90 TABLET | Refills: 1 | Status: SHIPPED | OUTPATIENT
Start: 2022-07-11

## 2022-07-22 ENCOUNTER — POSTPARTUM VISIT (OUTPATIENT)
Dept: OBSTETRICS AND GYNECOLOGY | Facility: CLINIC | Age: 27
End: 2022-07-22

## 2022-07-22 VITALS — BODY MASS INDEX: 21.43 KG/M2 | DIASTOLIC BLOOD PRESSURE: 60 MMHG | SYSTOLIC BLOOD PRESSURE: 110 MMHG | WEIGHT: 121 LBS

## 2022-07-22 DIAGNOSIS — Z78.9 USES SPANISH AS PRIMARY SPOKEN LANGUAGE: ICD-10-CM

## 2022-07-22 DIAGNOSIS — N63.0 BREAST MASS IN FEMALE: ICD-10-CM

## 2022-07-22 PROCEDURE — 0503F POSTPARTUM CARE VISIT: CPT | Performed by: OBSTETRICS & GYNECOLOGY

## 2022-07-22 NOTE — PROGRESS NOTES
Linda Escobedo is a 27 y.o. patient who presents for follow up of   Chief Complaint   Patient presents with   • Postpartum Care     2wk       HPI 2 weeks status post repeat low-transverse  section and tubal ligation.  Patient is doing quite well.  No fevers.  Pain is improving.  Bleeding is stopped.  She is breast-feeding without difficulty and denies any baby blues.  She did have a history of a left breast mass that need to be biopsied and needs to be scheduled today.    The following portions of the patient's history were reviewed and updated as appropriate: allergies, current medications and problem list.    Review of Systems   Constitutional: Negative for appetite change, fever and unexpected weight change.   HENT: Negative for congestion and sore throat.    Respiratory: Negative for cough and shortness of breath.    Cardiovascular: Negative for chest pain and palpitations.   Gastrointestinal: Negative for abdominal distention, abdominal pain, constipation, diarrhea, nausea and vomiting.   Endocrine: Negative.    Genitourinary: Negative for dyspareunia, menstrual problem, pelvic pain and vaginal discharge.   Skin: Negative.    Neurological: Negative for dizziness and syncope.   Hematological: Negative.    Psychiatric/Behavioral: Negative for dysphoric mood and sleep disturbance. The patient is not nervous/anxious.        /60   Wt 54.9 kg (121 lb)   LMP 2021   Breastfeeding Yes   BMI 21.43 kg/m²     Physical Exam  Vitals and nursing note reviewed.   Constitutional:       Appearance: She is well-developed.   HENT:      Head: Normocephalic and atraumatic.   Pulmonary:      Effort: Pulmonary effort is normal.   Abdominal:      General: Bowel sounds are normal. There is no distension.      Palpations: Abdomen is soft. There is no mass.      Tenderness: There is no abdominal tenderness. There is no guarding or rebound.      Comments: C/S incision well healed   Genitourinary:      Vagina: Normal.   Musculoskeletal:         General: Normal range of motion.   Skin:     General: Skin is warm and dry.   Neurological:      Mental Status: She is alert and oriented to person, place, and time.   Psychiatric:         Behavior: Behavior normal.         Thought Content: Thought content normal.         Judgment: Judgment normal.           Assessment/Plan    Diagnoses and all orders for this visit:    1. Postpartum care and examination (Primary)    2. Breast mass in female: Left breast. US 6/22/22. Needs biopsy (see report in Epic), plan to set up at her 2 week postpartum appt   -     US Guided Breast Biopsy With & Without Device initial; Future    3. Uses Latvian as primary spoken language    And  was used throughout this visit.  Normal exam postpartum.  Incision looks good.  Breast mass ultrasound core biopsy was ordered.    Return in about 4 weeks (around 8/19/2022) for Postpartum.      Peter Babb MD  7/22/2022  09:43 EDT

## 2022-08-11 ENCOUNTER — HOSPITAL ENCOUNTER (OUTPATIENT)
Dept: MAMMOGRAPHY | Facility: HOSPITAL | Age: 27
Discharge: HOME | End: 2022-08-11

## 2022-08-11 ENCOUNTER — HOSPITAL ENCOUNTER (OUTPATIENT)
Dept: ULTRASOUND IMAGING | Facility: HOSPITAL | Age: 27
Discharge: HOME | End: 2022-08-11

## 2022-08-11 DIAGNOSIS — N63.0 BREAST MASS IN FEMALE: ICD-10-CM

## 2022-08-11 PROCEDURE — 88305 TISSUE EXAM BY PATHOLOGIST: CPT | Performed by: OBSTETRICS & GYNECOLOGY

## 2022-08-11 PROCEDURE — 0 LIDOCAINE 1 % SOLUTION: Performed by: OBSTETRICS & GYNECOLOGY

## 2022-08-11 RX ORDER — LIDOCAINE HYDROCHLORIDE 10 MG/ML
5 INJECTION, SOLUTION INFILTRATION; PERINEURAL ONCE
Status: COMPLETED | OUTPATIENT
Start: 2022-08-11 | End: 2022-08-11

## 2022-08-11 RX ADMIN — LIDOCAINE HYDROCHLORIDE 5 ML: 10 INJECTION, SOLUTION INFILTRATION; PERINEURAL at 10:10

## 2022-08-11 RX ADMIN — SODIUM BICARBONATE 0.5 MEQ: 0.2 INJECTION, SOLUTION INTRAVENOUS at 10:10

## 2022-08-12 LAB
LAB AP CASE REPORT: NORMAL
LAB AP DIAGNOSIS COMMENT: NORMAL
PATH REPORT.FINAL DX SPEC: NORMAL
PATH REPORT.GROSS SPEC: NORMAL

## 2022-08-23 ENCOUNTER — POSTPARTUM VISIT (OUTPATIENT)
Dept: OBSTETRICS AND GYNECOLOGY | Facility: CLINIC | Age: 27
End: 2022-08-23

## 2022-08-23 VITALS
BODY MASS INDEX: 21.44 KG/M2 | HEIGHT: 63 IN | WEIGHT: 121 LBS | DIASTOLIC BLOOD PRESSURE: 64 MMHG | SYSTOLIC BLOOD PRESSURE: 110 MMHG

## 2022-08-23 DIAGNOSIS — D24.2 FIBROADENOMA OF LEFT BREAST: ICD-10-CM

## 2022-08-23 PROBLEM — Z30.2 REQUEST FOR STERILIZATION: Status: RESOLVED | Noted: 2021-12-01 | Resolved: 2022-08-23

## 2022-08-23 PROCEDURE — 0503F POSTPARTUM CARE VISIT: CPT | Performed by: OBSTETRICS & GYNECOLOGY

## 2022-08-23 NOTE — PROGRESS NOTES
"Linda Escobedo is a 27 y.o. patient who presents for follow up of   Chief Complaint   Patient presents with   • Postpartum Care       HPI 8 weeks status post repeat low-transverse  section and bilateral tubal ligation.  Patient is doing well.  She is sleeping well.  Breast-feeding without difficulty.  Denies any baby blues.  No fevers.  Bleeding is stopped.  Pain is gone.  She has had an interval biopsy of the left breast.  She has no complications from this stereotactic biopsy.    The following portions of the patient's history were reviewed and updated as appropriate: allergies, current medications and problem list.    Review of Systems   Constitutional: Negative for appetite change, fever and unexpected weight change.   HENT: Negative for congestion and sore throat.    Respiratory: Negative for cough and shortness of breath.    Cardiovascular: Negative for chest pain and palpitations.   Gastrointestinal: Negative for abdominal distention, abdominal pain, constipation, diarrhea, nausea and vomiting.   Endocrine: Negative.    Genitourinary: Negative for dyspareunia, menstrual problem, pelvic pain and vaginal discharge.   Skin: Negative.    Neurological: Negative for dizziness and syncope.   Hematological: Negative.    Psychiatric/Behavioral: Negative for dysphoric mood and sleep disturbance. The patient is not nervous/anxious.        /64   Ht 160 cm (63\")   Wt 54.9 kg (121 lb)   LMP 2022   BMI 21.43 kg/m²     Physical Exam  Vitals and nursing note reviewed.   Constitutional:       Appearance: She is well-developed.   HENT:      Head: Normocephalic and atraumatic.   Pulmonary:      Effort: Pulmonary effort is normal.   Abdominal:      General: Bowel sounds are normal. There is no distension.      Palpations: Abdomen is soft. There is no mass.      Tenderness: There is no abdominal tenderness. There is no guarding or rebound.      Comments: C/S incision well healed "   Genitourinary:     Vagina: Normal.   Musculoskeletal:         General: Normal range of motion.   Skin:     General: Skin is warm and dry.   Neurological:      Mental Status: She is alert and oriented to person, place, and time.   Psychiatric:         Behavior: Behavior normal.         Thought Content: Thought content normal.         Judgment: Judgment normal.     Pathology from stereotactic biopsy was reviewed with the patient.  Fibroadenoma of the left breast was diagnosed.  Patient reassured that this is benign.  Currently is not causing very many problems so we will follow it expectantly.      Assessment/Plan    Diagnoses and all orders for this visit:    1. Postpartum care and examination (Primary)  -     Cancel: POC Pregnancy, Urine  -     Cancel: POC Urinalysis Dipstick    2. Fibroadenoma of left breast    Benign mass of the left breast and normal postpartum exam.  Recommend yearly evaluation.  To general surgery if she would like the fibroadenoma removed.   was used throughout this visit and all questions were answered.    Return in about 1 year (around 8/23/2023) for Annual physical.      Peter Babb MD  8/23/2022  16:11 EDT
